# Patient Record
Sex: MALE | Race: WHITE | NOT HISPANIC OR LATINO
[De-identification: names, ages, dates, MRNs, and addresses within clinical notes are randomized per-mention and may not be internally consistent; named-entity substitution may affect disease eponyms.]

---

## 2017-03-21 ENCOUNTER — APPOINTMENT (OUTPATIENT)
Dept: HEART AND VASCULAR | Facility: CLINIC | Age: 67
End: 2017-03-21

## 2017-03-21 VITALS
TEMPERATURE: 98.5 F | WEIGHT: 185 LBS | OXYGEN SATURATION: 96 % | RESPIRATION RATE: 12 BRPM | DIASTOLIC BLOOD PRESSURE: 70 MMHG | BODY MASS INDEX: 30.79 KG/M2 | SYSTOLIC BLOOD PRESSURE: 122 MMHG | HEART RATE: 71 BPM

## 2017-03-21 RX ORDER — B-COMPLEX WITH VITAMIN C
TABLET ORAL
Refills: 0 | Status: ACTIVE | COMMUNITY

## 2017-09-26 ENCOUNTER — APPOINTMENT (OUTPATIENT)
Dept: HEART AND VASCULAR | Facility: CLINIC | Age: 67
End: 2017-09-26
Payer: MEDICARE

## 2017-09-26 VITALS
HEART RATE: 65 BPM | DIASTOLIC BLOOD PRESSURE: 60 MMHG | HEIGHT: 65 IN | OXYGEN SATURATION: 96 % | TEMPERATURE: 98.1 F | WEIGHT: 188.03 LBS | BODY MASS INDEX: 31.33 KG/M2 | SYSTOLIC BLOOD PRESSURE: 108 MMHG | RESPIRATION RATE: 12 BRPM

## 2017-09-26 PROCEDURE — 93000 ELECTROCARDIOGRAM COMPLETE: CPT

## 2017-09-26 PROCEDURE — 99214 OFFICE O/P EST MOD 30 MIN: CPT | Mod: 25

## 2017-09-26 RX ORDER — BETAMETHASONE DIPROPIONATE 0.5 MG/G
0.05 CREAM, AUGMENTED TOPICAL
Qty: 50 | Refills: 0 | Status: ACTIVE | COMMUNITY
Start: 2017-04-12

## 2017-09-26 RX ORDER — BETAMETHASONE DIPROPIONATE 0.5 MG/G
0.05 CREAM TOPICAL
Refills: 0 | Status: DISCONTINUED | COMMUNITY
End: 2017-09-26

## 2017-09-26 RX ORDER — CALCIPOTRIENE 50 UG/G
0.01 CREAM TOPICAL
Qty: 60 | Refills: 0 | Status: ACTIVE | COMMUNITY
Start: 2017-04-12

## 2018-04-26 ENCOUNTER — APPOINTMENT (OUTPATIENT)
Dept: HEART AND VASCULAR | Facility: CLINIC | Age: 68
End: 2018-04-26
Payer: MEDICARE

## 2018-04-26 VITALS
BODY MASS INDEX: 30.66 KG/M2 | DIASTOLIC BLOOD PRESSURE: 70 MMHG | OXYGEN SATURATION: 96 % | TEMPERATURE: 98.8 F | SYSTOLIC BLOOD PRESSURE: 126 MMHG | WEIGHT: 184 LBS | HEIGHT: 65 IN | HEART RATE: 63 BPM | RESPIRATION RATE: 12 BRPM

## 2018-04-26 DIAGNOSIS — E03.9 HYPOTHYROIDISM, UNSPECIFIED: ICD-10-CM

## 2018-04-26 PROCEDURE — 99214 OFFICE O/P EST MOD 30 MIN: CPT | Mod: 25

## 2018-04-26 PROCEDURE — 93000 ELECTROCARDIOGRAM COMPLETE: CPT

## 2018-10-03 ENCOUNTER — APPOINTMENT (OUTPATIENT)
Dept: HEART AND VASCULAR | Facility: CLINIC | Age: 68
End: 2018-10-03
Payer: MEDICARE

## 2018-10-03 PROCEDURE — 99212 OFFICE O/P EST SF 10 MIN: CPT | Mod: 25

## 2018-10-03 PROCEDURE — 78452 HT MUSCLE IMAGE SPECT MULT: CPT

## 2018-10-03 PROCEDURE — 93015 CV STRESS TEST SUPVJ I&R: CPT

## 2018-10-03 PROCEDURE — A9500: CPT

## 2018-10-29 ENCOUNTER — RX RENEWAL (OUTPATIENT)
Age: 68
End: 2018-10-29

## 2018-12-13 ENCOUNTER — EMERGENCY (EMERGENCY)
Facility: HOSPITAL | Age: 68
LOS: 1 days | Discharge: HOME | End: 2018-12-13
Admitting: EMERGENCY MEDICINE
Payer: MEDICARE

## 2018-12-13 VITALS
RESPIRATION RATE: 18 BRPM | WEIGHT: 179.9 LBS | HEART RATE: 66 BPM | TEMPERATURE: 98 F | SYSTOLIC BLOOD PRESSURE: 179 MMHG | OXYGEN SATURATION: 96 % | DIASTOLIC BLOOD PRESSURE: 83 MMHG

## 2018-12-13 DIAGNOSIS — Z79.899 OTHER LONG TERM (CURRENT) DRUG THERAPY: ICD-10-CM

## 2018-12-13 DIAGNOSIS — B02.9 ZOSTER WITHOUT COMPLICATIONS: ICD-10-CM

## 2018-12-13 DIAGNOSIS — R21 RASH AND OTHER NONSPECIFIC SKIN ERUPTION: ICD-10-CM

## 2018-12-13 DIAGNOSIS — I10 ESSENTIAL (PRIMARY) HYPERTENSION: ICD-10-CM

## 2018-12-13 PROCEDURE — 99283 EMERGENCY DEPT VISIT LOW MDM: CPT

## 2018-12-13 RX ORDER — RAMIPRIL 5 MG
0 CAPSULE ORAL
Qty: 0 | Refills: 0 | COMMUNITY

## 2018-12-13 RX ORDER — ACYCLOVIR SODIUM 500 MG
1 VIAL (EA) INTRAVENOUS
Qty: 35 | Refills: 0 | OUTPATIENT
Start: 2018-12-13 | End: 2018-12-19

## 2018-12-13 RX ORDER — ACYCLOVIR SODIUM 500 MG
800 VIAL (EA) INTRAVENOUS ONCE
Qty: 0 | Refills: 0 | Status: DISCONTINUED | OUTPATIENT
Start: 2018-12-13 | End: 2018-12-13

## 2018-12-13 RX ORDER — ACYCLOVIR SODIUM 500 MG
800 VIAL (EA) INTRAVENOUS ONCE
Qty: 0 | Refills: 0 | Status: COMPLETED | OUTPATIENT
Start: 2018-12-13 | End: 2018-12-13

## 2018-12-13 RX ADMIN — Medication 800 MILLIGRAM(S): at 18:03

## 2018-12-13 NOTE — ED ADULT TRIAGE NOTE - CHIEF COMPLAINT QUOTE
noticed rash under right side of chin yesterday -- put topical antibiotic and it seemed better but wanted to have it checked

## 2018-12-13 NOTE — ED PROVIDER NOTE - MEDICAL DECISION MAKING DETAILS
69 yo male in the ER due to new rash on  his neck-vesicular, dermatomal distribution, unilateral- findings suspicious for shingles. will start antivirals, d/c home for out pt f/u with his PMD

## 2018-12-13 NOTE — ED ADULT NURSE NOTE - CHPI ED NUR CONTEXT2
Implemented All Universal Safety Interventions:  [place] to call system. Call bell, personal items and telephone within reach. Instruct patient to call for assistance. Room bathroom lighting operational. Non-slip footwear when patient is off stretcher. Physically safe environment: no spills, clutter or unnecessary equipment. Stretcher in lowest position, wheels locked, appropriate side rails in place. unknown

## 2018-12-13 NOTE — ED ADULT NURSE NOTE - NSIMPLEMENTINTERV_GEN_ALL_ED
Implemented All Universal Safety Interventions:  [place] to call system. Call bell, personal items and telephone within reach. Instruct patient to call for assistance. Room bathroom lighting operational. Non-slip footwear when patient is off stretcher. Physically safe environment: no spills, clutter or unnecessary equipment. Stretcher in lowest position, wheels locked, appropriate side rails in place.

## 2018-12-13 NOTE — ED PROVIDER NOTE - SKIN, MLM
Skin normal color for race, warm, dry and intact. grouped vesicular rash under the right mandible. no active drainage

## 2018-12-13 NOTE — ED ADULT NURSE NOTE - CHPI ED NUR SYMPTOMS NEG
no body aches/no petechia/no vomiting/no pain/no chills/no itching/no scaly patches on skin/no fever/no confusion/no inflammation/no decreased eating/drinking

## 2018-12-13 NOTE — ED PROVIDER NOTE - OBJECTIVE STATEMENT
68bl[age] yesterday  yo mal in the ER due to rash on his right side of the neck. Pt noted 3 small blisters with clear fluid yesterday. this morning it was 4 blisters. pt had staph infection in the past and is very concerned that's its coming back again

## 2018-12-13 NOTE — ED ADULT NURSE NOTE - OBJECTIVE STATEMENT
Pt came to ED complaining of rash since yesterday morning. Pt presents with reddened rash on right chin. Pt purulent drainage or bleeding found at this time. Pt denies fever, chills, D/N/V, chest pain, SOB, /GI symptoms.  Pt is alert and oriented x3. Hx of staph infections. Denies all other medical complaints at this time.

## 2019-01-29 ENCOUNTER — RX RENEWAL (OUTPATIENT)
Age: 69
End: 2019-01-29

## 2019-04-17 ENCOUNTER — APPOINTMENT (OUTPATIENT)
Dept: HEART AND VASCULAR | Facility: CLINIC | Age: 69
End: 2019-04-17
Payer: MEDICARE

## 2019-04-17 VITALS
SYSTOLIC BLOOD PRESSURE: 116 MMHG | WEIGHT: 185 LBS | OXYGEN SATURATION: 95 % | HEIGHT: 64.5 IN | HEART RATE: 66 BPM | BODY MASS INDEX: 31.2 KG/M2 | RESPIRATION RATE: 14 BRPM | TEMPERATURE: 98.1 F | DIASTOLIC BLOOD PRESSURE: 72 MMHG

## 2019-04-17 PROBLEM — L57.0 ACTINIC KERATOSIS: Chronic | Status: ACTIVE | Noted: 2018-12-13

## 2019-04-17 PROBLEM — I10 ESSENTIAL (PRIMARY) HYPERTENSION: Chronic | Status: ACTIVE | Noted: 2018-12-13

## 2019-04-17 PROBLEM — A49.01 METHICILLIN SUSCEPTIBLE STAPHYLOCOCCUS AUREUS INFECTION, UNSPECIFIED SITE: Chronic | Status: ACTIVE | Noted: 2018-12-13

## 2019-04-17 PROCEDURE — 93000 ELECTROCARDIOGRAM COMPLETE: CPT

## 2019-04-17 PROCEDURE — 99214 OFFICE O/P EST MOD 30 MIN: CPT

## 2019-04-17 NOTE — DISCUSSION/SUMMARY
[FreeTextEntry1] : CAD--I suggested that he continue his current medication. I encouraged healthy diet and aerobic activity

## 2019-07-11 ENCOUNTER — APPOINTMENT (OUTPATIENT)
Dept: ORTHOPEDIC SURGERY | Facility: CLINIC | Age: 69
End: 2019-07-11
Payer: MEDICARE

## 2019-07-11 VITALS — HEIGHT: 65 IN | BODY MASS INDEX: 30.82 KG/M2 | WEIGHT: 185 LBS | RESPIRATION RATE: 16 BRPM

## 2019-07-11 PROCEDURE — 73070 X-RAY EXAM OF ELBOW: CPT | Mod: 50

## 2019-07-11 PROCEDURE — 99203 OFFICE O/P NEW LOW 30 MIN: CPT | Mod: 25

## 2019-07-11 PROCEDURE — 20605 DRAIN/INJ JOINT/BURSA W/O US: CPT | Mod: RT

## 2019-07-11 NOTE — CONSULT LETTER
[Dear  ___] : Dear  [unfilled], [FreeTextEntry3] : Carlos Rosas M.D., FAAOS\par Co-Director\par [facility]\par

## 2019-07-11 NOTE — PHYSICAL EXAM
[de-identified] : Physical exam shows the patient to be alert and oriented x3, capable of ambulation. The patient is well-developed and well-nourished in no apparent respiratory distress. Majority of the skin is intact bilaterally in the upper extremities without lymphadenopathy at the elbows.\par \par There is a 4 x 4 centimeters soft tissue mass in the dorsal aspect of the right olecranon which is nontender and has no evidence of infection.\par \par The biceps and triceps is intact with full range of motion of the elbow with no tenderness over the insertion site of the biceps or triceps.\par \par There is no tenderness of the mediolateral epicondyles and full range of motion the elbow which is symmetric to the opposite side.\par \par No sensitivity over the radial ulnar and median nerves no evidence of instability of the nerve.\par \par The wrists have a symmetric range of motion bilaterally. There is no tenderness over the scaphoid, scapholunate or lunotriquetral ligaments bilaterally. There is a negative Velasquez test bilaterally. There is negative tenderness over the radial ulnar joint or TFCC and no evidence of instability bilaterally. No tenderness over the pisotriquetral or hamate hook or CMC joint bilaterally. There is 5 over 5 strength of the wrists bilaterally.\par \par There is good capillary refill of the digits bilaterally.There is no masses or sensitivity over the median and ulnar nerves at the level of the wrist. There is a negative Tinel's and negative Phalen's sign bilaterally. The sensation is grossly intact bilaterally. [de-identified] : PA and lateral of the elbows bilaterally show soft tissue density of the right elbow in the region of the olecranon bursa without evidence of soft tissue calcifications. Joint spaces are well preserved. There is symmetric with the opposite left elbow.

## 2019-07-11 NOTE — ASSESSMENT
[FreeTextEntry1] : 6 months status post right olecranon bursitis without evidence of infection.\par \par The risks, benefits, alternatives and associated differential diagnosis was discussed with the patient. Options ranged from conservative care to surgical intervention were reviewed and all questions answered. Patient appeared to have an excellent understanding of the risks as well as differential diagnosis associated with this condition.\par \par Patient elected aspiration which yielded 8 cc of clear yellow fluid without evidence of infection.\par The area was prepped with alcohol prior to aspiration to reduce risk of infection.\par 1 cc of Kenalog was instilled into the bursitis in hopes of reducing the risk of recurrence.\par \par A bulky Cai dressing was applied and a home program was outlined.\par \par Return to the office in 4 weeks if not fully resolved otherwise on an as-needed basis.

## 2019-07-11 NOTE — HISTORY OF PRESENT ILLNESS
[Left] : left hand dominant [FreeTextEntry1] : DOI- 2/2019\par Patient presents with right elbow injury sustained from a fall. He never had it treated and states it is not painful. There is swelling and bruising on the elbow

## 2019-07-23 ENCOUNTER — APPOINTMENT (OUTPATIENT)
Dept: CARDIOLOGY | Facility: CLINIC | Age: 69
End: 2019-07-23

## 2019-08-08 ENCOUNTER — APPOINTMENT (OUTPATIENT)
Dept: ORTHOPEDIC SURGERY | Facility: CLINIC | Age: 69
End: 2019-08-08
Payer: MEDICARE

## 2019-08-08 VITALS — RESPIRATION RATE: 16 BRPM | HEIGHT: 65 IN | BODY MASS INDEX: 30.82 KG/M2 | WEIGHT: 185 LBS

## 2019-08-08 PROCEDURE — 20605 DRAIN/INJ JOINT/BURSA W/O US: CPT | Mod: RT

## 2019-08-08 PROCEDURE — 99214 OFFICE O/P EST MOD 30 MIN: CPT | Mod: 25

## 2019-10-31 ENCOUNTER — APPOINTMENT (OUTPATIENT)
Dept: HEART AND VASCULAR | Facility: CLINIC | Age: 69
End: 2019-10-31
Payer: MEDICARE

## 2019-10-31 VITALS
WEIGHT: 181.04 LBS | BODY MASS INDEX: 30.16 KG/M2 | TEMPERATURE: 98.3 F | RESPIRATION RATE: 16 BRPM | OXYGEN SATURATION: 95 % | HEART RATE: 72 BPM | SYSTOLIC BLOOD PRESSURE: 126 MMHG | HEIGHT: 65 IN | DIASTOLIC BLOOD PRESSURE: 70 MMHG

## 2019-10-31 PROCEDURE — 93000 ELECTROCARDIOGRAM COMPLETE: CPT

## 2019-10-31 PROCEDURE — 99214 OFFICE O/P EST MOD 30 MIN: CPT

## 2019-10-31 NOTE — DISCUSSION/SUMMARY
[FreeTextEntry1] : CAD--I suggested that he continue his cardiac medication and risk factor reduction

## 2019-10-31 NOTE — HISTORY OF PRESENT ILLNESS
[FreeTextEntry1] : 69 year male who is walking 1 mile a few times a week without any symptoms. He climbs 2 half flights of stairs 2x/week without SOB or chest pain. He denies having PND, orthopnea, swelling. No palpitations or dizziness

## 2020-02-20 ENCOUNTER — APPOINTMENT (OUTPATIENT)
Dept: OTOLARYNGOLOGY | Facility: CLINIC | Age: 70
End: 2020-02-20
Payer: MEDICARE

## 2020-02-20 VITALS
BODY MASS INDEX: 29.82 KG/M2 | HEIGHT: 65 IN | WEIGHT: 179 LBS | HEART RATE: 61 BPM | DIASTOLIC BLOOD PRESSURE: 74 MMHG | SYSTOLIC BLOOD PRESSURE: 148 MMHG

## 2020-02-20 PROCEDURE — 99204 OFFICE O/P NEW MOD 45 MIN: CPT

## 2020-02-28 NOTE — HISTORY OF PRESENT ILLNESS
[de-identified] : 70M here for initial evaluation.\par \par Less than 2 weeks ago he noticed a bump on his tongue with surrounding yellow/brownish dots. He started peroxide rinses on his own and the discoloration improved. Since then, the bump has also gotten a bit smaller and he is here for evaluation. There is no pain and no bleeding, no tongue weakness or altered sensation.\par No recent trauma.\par Nonsmoker, no etoh. \par Not on any steroid inhalers.\par \par ROS otherwise unremarkable.

## 2020-02-28 NOTE — PHYSICAL EXAM
[de-identified] : no palpable masses/lesions, neck soft and flat [Midline] : trachea located in midline position [de-identified] : tongue midline, fully mobile. ~5x5mm firm, well defined submucosal lesion over dorsal oral tongue, just to the right of midline, nontender, overlying mucosa intact, no ulceration or friability. [Normal] : orientation to person, place, and time: normal

## 2020-02-28 NOTE — CONSULT LETTER
[Dear  ___] : Dear  [unfilled], [Consult Closing:] : Thank you very much for allowing me to participate in the care of this patient.  If you have any questions, please do not hesitate to contact me. [Sincerely,] : Sincerely, [Courtesy Letter:] : I had the pleasure of seeing your patient, [unfilled], in my office today. [FreeTextEntry3] : Abhijit Yoon MD\par Department of Otolaryngology - Head and Neck Surgery\par [facility] [DrMelyssa  ___] : Dr. KHAN [DrMelyssa ___] : Dr. KHAN

## 2020-02-28 NOTE — ASSESSMENT
[FreeTextEntry1] : 70M here for initial evaluation. 13 days ago he noticed a bump on his tongue with surrounding yellow/brownish dots. He started peroxide rinses on his own and the discoloration improved. Since then, the bump has also gotten a bit smaller and he is here for evaluation. There is no pain and no bleeding, no tongue weakness or altered sensation. There is no recent trauma. He is a nonsmoker, no h/o etoh use. He is also not on any steroid inhalers. On exam, the tongue is fully mobile. There is a 5x5mm firm, well defined submucosal lesion over the dorsal oral tongue, just to the right of midline; it is nontender and the overlying mucosa is intact, with no ulceration or friability. The rest of the head and neck exam is unremarkable.\par He has a firm, well defined submucosal lesion of the right dorsal oral tongue of unclear etiology. It clinically appears benign and it has been present for less than 2 weeks and he feels it is getting smaller. For now, since only present for 13 days, will observe closely - RTO in 2-3 weeks. Should it remain (unchanged or larger) then would proceed to biopsy and getting imaging. This was all discussed at length and all questions answered.

## 2020-03-17 ENCOUNTER — APPOINTMENT (OUTPATIENT)
Dept: OTOLARYNGOLOGY | Facility: CLINIC | Age: 70
End: 2020-03-17
Payer: MEDICARE

## 2020-03-17 VITALS
BODY MASS INDEX: 29.89 KG/M2 | WEIGHT: 179.4 LBS | HEIGHT: 65 IN | HEART RATE: 65 BPM | TEMPERATURE: 98.7 F | SYSTOLIC BLOOD PRESSURE: 142 MMHG | DIASTOLIC BLOOD PRESSURE: 78 MMHG

## 2020-03-17 PROCEDURE — 99213 OFFICE O/P EST LOW 20 MIN: CPT

## 2020-03-17 NOTE — PHYSICAL EXAM
[de-identified] : no palpable masses/lesions, neck soft and flat [Midline] : trachea located in midline position [de-identified] : tongue midline, fully mobile. ~5x5mm firm, well defined submucosal lesion over dorsal oral tongue, just to the right of midline, nontender, overlying mucosa intact, no ulceration or friability. geographic tongue, some darker discoloration over dorsal surface [Normal] : no rashes

## 2020-03-17 NOTE — PHYSICAL EXAM
[de-identified] : no palpable masses/lesions, neck soft and flat [Midline] : trachea located in midline position [de-identified] : tongue midline, fully mobile. ~5x5mm firm, well defined submucosal lesion over dorsal oral tongue, just to the right of midline, nontender, overlying mucosa intact, no ulceration or friability. geographic tongue, some darker discoloration over dorsal surface [Normal] : no rashes

## 2020-03-17 NOTE — ASSESSMENT
[FreeTextEntry1] : 70M here in followup. Since last seen four weeks prior, he has no new complaints. The lesion on his tongue has remained, unchanged - no bigger or smaller. He was initially seen four weeks ago for a bump on his tongue for two weeks with surrounding yellow/brownish dots. At that time, he started peroxide rinses on his own and the discoloration improved. Since then, the bump has also gotten a bit smaller. \par There remains no pain and no bleeding, no tongue weakness or altered sensation and otherwise no other associated sx. There is no recent trauma. He is a nonsmoker, no h/o etoh use. \par On exam, the tongue is fully mobile. There is a 5x5mm firm, well defined submucosal lesion over the dorsal oral tongue, just to the right of midline; it is nontender and the overlying mucosa is intact, with no ulceration or friability. There is a symmetrically geographic tongue, some darker discoloration over the dorsal surface. The rest of the head and neck exam is unremarkable.\par He still has a firm, well defined, painless submucosal lesion of the right dorsal oral tongue of unclear etiology. It clinically appears benign and it has been present for around 6 weeks and he feels it is not changing in size. At this time, I will proceed with MRI for imaging. MRI results to dictate need for tissue sampling. I ill touch base w pt after imaging. This was all discussed at length and all questions answered.

## 2020-03-17 NOTE — CONSULT LETTER
[Dear  ___] : Dear  [unfilled], [Courtesy Letter:] : I had the pleasure of seeing your patient, [unfilled], in my office today. [Consult Closing:] : Thank you very much for allowing me to participate in the care of this patient.  If you have any questions, please do not hesitate to contact me. [Sincerely,] : Sincerely, [FreeTextEntry3] : Abhijit Yoon MD\par Department of Otolaryngology - Head and Neck Surgery\par [facility] [DrMelyssa  ___] : Dr. KHAN [DrMelyssa ___] : Dr. KHAN

## 2020-03-17 NOTE — HISTORY OF PRESENT ILLNESS
[de-identified] : 70M here in followup.\par \par Since last seen four weeks prior, he has no new complaints. The lesion on his tongue has remained, unchanged - no bigger or smaller.\par Initially seen four weeks ago for a bump on his tongue for two weeks with surrounding yellow/brownish dots. At that time, he started peroxide rinses on his own and the discoloration improved. Since then, the bump has also gotten a bit smaller. \par \par There remains no pain and no bleeding, no tongue weakness or altered sensation and otherwise no other associated sx.\par \par No recent trauma.\par Nonsmoker, no etoh. \par Not on any steroid inhalers.\par \par ROS otherwise unremarkable.

## 2020-03-17 NOTE — CONSULT LETTER
[Dear  ___] : Dear  [unfilled], [Courtesy Letter:] : I had the pleasure of seeing your patient, [unfilled], in my office today. [Consult Closing:] : Thank you very much for allowing me to participate in the care of this patient.  If you have any questions, please do not hesitate to contact me. [Sincerely,] : Sincerely, [FreeTextEntry3] : Abhijit Yoon MD\par Department of Otolaryngology - Head and Neck Surgery\par [facility] [DrMelyssa  ___] : Dr. KHAN [DrMelyssa ___] : Dr. KHAN

## 2020-07-10 ENCOUNTER — APPOINTMENT (OUTPATIENT)
Dept: HEART AND VASCULAR | Facility: CLINIC | Age: 70
End: 2020-07-10
Payer: MEDICARE

## 2020-07-10 VITALS
RESPIRATION RATE: 16 BRPM | BODY MASS INDEX: 29.99 KG/M2 | SYSTOLIC BLOOD PRESSURE: 122 MMHG | HEIGHT: 65 IN | HEART RATE: 62 BPM | DIASTOLIC BLOOD PRESSURE: 84 MMHG | OXYGEN SATURATION: 96 % | TEMPERATURE: 98.9 F | WEIGHT: 180 LBS

## 2020-07-10 PROCEDURE — 93000 ELECTROCARDIOGRAM COMPLETE: CPT

## 2020-07-10 PROCEDURE — 99214 OFFICE O/P EST MOD 30 MIN: CPT

## 2020-07-10 NOTE — HISTORY OF PRESENT ILLNESS
[FreeTextEntry1] : 70 year male who denies having any chest pain, SOB, palpitations, edema, PND or othopnea. He believes he had an Echocardiogram with Dr Cameron. He describes a scare relating to a tongue lesion. Recent labs reviewed

## 2020-07-10 NOTE — DISCUSSION/SUMMARY
[FreeTextEntry1] : CAD--I asked him to continue his cardiac medications and return in October for a Nuclear Stress Test

## 2020-07-10 NOTE — PHYSICAL EXAM
[Normal Appearance] : normal appearance [General Appearance - Well Developed] : well developed [Well Groomed] : well groomed [No Deformities] : no deformities [General Appearance - Well Nourished] : well nourished [Normal Conjunctiva] : the conjunctiva exhibited no abnormalities [General Appearance - In No Acute Distress] : no acute distress [] : no respiratory distress [Respiration, Rhythm And Depth] : normal respiratory rhythm and effort [Exaggerated Use Of Accessory Muscles For Inspiration] : no accessory muscle use [Auscultation Breath Sounds / Voice Sounds] : lungs were clear to auscultation bilaterally [Heart Rate And Rhythm] : heart rate and rhythm were normal [Heart Sounds] : normal S1 and S2 [Oriented To Time, Place, And Person] : oriented to person, place, and time [Skin Turgor] : normal skin turgor [Abnormal Walk] : normal gait [No Anxiety] : not feeling anxious [Mood] : the mood was normal [Affect] : the affect was normal

## 2020-10-21 ENCOUNTER — APPOINTMENT (OUTPATIENT)
Dept: HEART AND VASCULAR | Facility: CLINIC | Age: 70
End: 2020-10-21
Payer: MEDICARE

## 2020-10-21 VITALS
HEIGHT: 65 IN | DIASTOLIC BLOOD PRESSURE: 80 MMHG | RESPIRATION RATE: 16 BRPM | SYSTOLIC BLOOD PRESSURE: 132 MMHG | BODY MASS INDEX: 29.49 KG/M2 | TEMPERATURE: 93.3 F | OXYGEN SATURATION: 97 % | HEART RATE: 59 BPM | WEIGHT: 177 LBS

## 2020-10-21 PROCEDURE — 78452 HT MUSCLE IMAGE SPECT MULT: CPT

## 2020-10-21 PROCEDURE — 93015 CV STRESS TEST SUPVJ I&R: CPT

## 2020-10-21 PROCEDURE — A9500: CPT

## 2020-10-21 NOTE — ASSESSMENT
[FreeTextEntry1] : At the time of the patient's visit a Nuclear Stress Test was performed to evaluate for exercise induced arrhythmia and ischemia. At the time of the visit the results were reviewed with patient \par \par CAD--I encouraged continued risk factor reduction

## 2021-04-20 ENCOUNTER — NON-APPOINTMENT (OUTPATIENT)
Age: 71
End: 2021-04-20

## 2021-04-21 ENCOUNTER — APPOINTMENT (OUTPATIENT)
Dept: HEART AND VASCULAR | Facility: CLINIC | Age: 71
End: 2021-04-21
Payer: MEDICARE

## 2021-04-21 VITALS
OXYGEN SATURATION: 96 % | SYSTOLIC BLOOD PRESSURE: 124 MMHG | BODY MASS INDEX: 29.49 KG/M2 | HEIGHT: 65 IN | HEART RATE: 67 BPM | WEIGHT: 177.01 LBS | RESPIRATION RATE: 16 BRPM | DIASTOLIC BLOOD PRESSURE: 70 MMHG | TEMPERATURE: 93.7 F

## 2021-04-21 PROCEDURE — 93000 ELECTROCARDIOGRAM COMPLETE: CPT

## 2021-04-21 PROCEDURE — 99214 OFFICE O/P EST MOD 30 MIN: CPT

## 2021-04-21 NOTE — HISTORY OF PRESENT ILLNESS
[FreeTextEntry1] : 71 year male who is now on Vitamin C 1000, Vitamin D3 5000 iu daily, Vitamin B12 mcg, CoQ 10 100 mg and Zinc 50 mg. He is also on 2 tablespoons of psyllium husk. He denies having any chest pain, SOB, [place], dizziness, palpitations, orthopnea, PND or syncope

## 2021-04-21 NOTE — PHYSICAL EXAM
[General Appearance - Well Developed] : well developed [Normal Appearance] : normal appearance [Well Groomed] : well groomed [General Appearance - Well Nourished] : well nourished [No Deformities] : no deformities [General Appearance - In No Acute Distress] : no acute distress [Normal Conjunctiva] : the conjunctiva exhibited no abnormalities [] : no respiratory distress [Respiration, Rhythm And Depth] : normal respiratory rhythm and effort [Exaggerated Use Of Accessory Muscles For Inspiration] : no accessory muscle use [Auscultation Breath Sounds / Voice Sounds] : lungs were clear to auscultation bilaterally [Heart Sounds] : normal S1 and S2 [Abnormal Walk] : normal gait [Skin Turgor] : normal skin turgor [Affect] : the affect was normal [Oriented To Time, Place, And Person] : oriented to person, place, and time [Mood] : the mood was normal [No Anxiety] : not feeling anxious

## 2021-04-21 NOTE — ASSESSMENT
[FreeTextEntry1] :  CAD--We discussed that a statin like Lipitor 40 mg or Crestor 20 mg daily is indicated based on current GDMT (guideline directed medical therapy) in placed of his Simvastatin despite his low Lipids numbers

## 2021-07-08 ENCOUNTER — APPOINTMENT (OUTPATIENT)
Dept: UROLOGY | Facility: CLINIC | Age: 71
End: 2021-07-08
Payer: MEDICARE

## 2021-07-08 VITALS
SYSTOLIC BLOOD PRESSURE: 148 MMHG | HEART RATE: 66 BPM | HEIGHT: 66 IN | OXYGEN SATURATION: 96 % | BODY MASS INDEX: 28.93 KG/M2 | DIASTOLIC BLOOD PRESSURE: 77 MMHG | WEIGHT: 180 LBS | TEMPERATURE: 97.2 F

## 2021-07-08 DIAGNOSIS — I10 ESSENTIAL (PRIMARY) HYPERTENSION: ICD-10-CM

## 2021-07-08 DIAGNOSIS — M70.21 OLECRANON BURSITIS, RIGHT ELBOW: ICD-10-CM

## 2021-07-08 DIAGNOSIS — K14.8 OTHER DISEASES OF TONGUE: ICD-10-CM

## 2021-07-08 DIAGNOSIS — L40.9 PSORIASIS, UNSPECIFIED: ICD-10-CM

## 2021-07-08 LAB
BILIRUB UR QL STRIP: NORMAL
CLARITY UR: CLEAR
COLLECTION METHOD: NORMAL
GLUCOSE UR-MCNC: NORMAL
HCG UR QL: 0.2 EU/DL
HGB UR QL STRIP.AUTO: NORMAL
KETONES UR-MCNC: NORMAL
LEUKOCYTE ESTERASE UR QL STRIP: NORMAL
NITRITE UR QL STRIP: NORMAL
PH UR STRIP: 7
PROT UR STRIP-MCNC: 100
SP GR UR STRIP: 1.03

## 2021-07-08 PROCEDURE — 99204 OFFICE O/P NEW MOD 45 MIN: CPT

## 2021-07-08 PROCEDURE — 51798 US URINE CAPACITY MEASURE: CPT

## 2021-07-08 RX ORDER — ROSUVASTATIN CALCIUM 20 MG/1
20 TABLET, FILM COATED ORAL
Refills: 0 | Status: ACTIVE | COMMUNITY

## 2021-07-08 NOTE — PHYSICAL EXAM
[General Appearance - Well Developed] : well developed [General Appearance - Well Nourished] : well nourished [Normal Appearance] : normal appearance [Well Groomed] : well groomed [General Appearance - In No Acute Distress] : no acute distress [Edema] : no peripheral edema [Respiration, Rhythm And Depth] : normal respiratory rhythm and effort [Exaggerated Use Of Accessory Muscles For Inspiration] : no accessory muscle use [Abdomen Soft] : soft [Abdomen Tenderness] : non-tender [Costovertebral Angle Tenderness] : no ~M costovertebral angle tenderness [Urethral Meatus] : meatus normal [Urinary Bladder Findings] : the bladder was normal on palpation [Scrotum] : the scrotum was normal [Testes Mass (___cm)] : there were no testicular masses [No Prostate Nodules] : no prostate nodules [Normal Station and Gait] : the gait and station were normal for the patient's age [] : no rash [No Focal Deficits] : no focal deficits [Oriented To Time, Place, And Person] : oriented to person, place, and time [Affect] : the affect was normal [Mood] : the mood was normal [Not Anxious] : not anxious [No Palpable Adenopathy] : no palpable adenopathy [Heart Rate And Rhythm] : Heart rate and rhythm were normal [Abdomen Mass (___ Cm)] : no abdominal mass palpated [Penis Abnormality] : normal circumcised penis [Epididymis] : the epididymides were normal [Testes Tenderness] : no tenderness of the testes [Skin Color & Pigmentation] : normal skin color and pigmentation [FreeTextEntry1] : Mild focal glans erythema.

## 2021-07-08 NOTE — HISTORY OF PRESENT ILLNESS
[FreeTextEntry1] : Mr. LIDA SEAY comes in today for a urologic evaluation.  He presents with moderate stable lower urinary tract symptoms (obstructive and irritative) with nocturia x 2.\par IPSS: 20/35\par Sono: 57cc PVR\par \par His erections are reportedly normal.\par \par Pelvic US:  3/30/21--76cc prostate. 115cc PVR. Bladder diverticulum\par \par Creat:  4/16/21--0.75mg/dl\par \par PSA:  4/16/21--0.33ng/ml\par

## 2021-07-08 NOTE — ASSESSMENT
[FreeTextEntry1] : I discussed the findings and options with Mr. LIDA HOFFMAN in detail.  No intervention is warranted for his stable lower urinary tract symptoms as he does not want to be on pharmacologic therapy or undergo surgical intervention.  Consequently, Mr. Hoffman will simply monitor his progress and proceed with a digital rectal exam and PSA annually.\par \par \par

## 2021-07-08 NOTE — LETTER BODY
[Dear  ___] : Dear  [unfilled], [Consult Letter:] : I had the pleasure of evaluating your patient, [unfilled]. [Please see my note below.] : Please see my note below. [Consult Closing:] : Thank you very much for allowing me to participate in the care of this patient.  If you have any questions, please do not hesitate to contact me. [Sincerely,] : Sincerely, [FreeTextEntry3] : Liu Arriaga MD, FACS

## 2021-07-08 NOTE — ADDENDUM
[FreeTextEntry1] : A portion of this note was written by [Felix Rosario] on 07/07/2021 acting as a scribe for Dr. Arriaga. \par \par I have personally reviewed the chart and agree that the record accurately reflects my personal performance of the history, physical exam, assessment, and plan.

## 2021-10-20 ENCOUNTER — APPOINTMENT (OUTPATIENT)
Dept: HEART AND VASCULAR | Facility: CLINIC | Age: 71
End: 2021-10-20
Payer: MEDICARE

## 2021-10-20 VITALS
WEIGHT: 172 LBS | DIASTOLIC BLOOD PRESSURE: 86 MMHG | OXYGEN SATURATION: 95 % | SYSTOLIC BLOOD PRESSURE: 140 MMHG | BODY MASS INDEX: 27.64 KG/M2 | HEIGHT: 66 IN | TEMPERATURE: 97.1 F | HEART RATE: 60 BPM

## 2021-10-20 VITALS — SYSTOLIC BLOOD PRESSURE: 136 MMHG | DIASTOLIC BLOOD PRESSURE: 78 MMHG

## 2021-10-20 PROCEDURE — 99214 OFFICE O/P EST MOD 30 MIN: CPT

## 2021-10-20 NOTE — ASSESSMENT
[FreeTextEntry1] : We discussed the goal of BP < 130/80 with option to increase Ramipril to 5 mg daily if not improved

## 2021-10-20 NOTE — HISTORY OF PRESENT ILLNESS
[FreeTextEntry1] : 71 year male with CAD. We reviewed his recent labs. He denies having chest pain, SOB, [place], dizziness, palpitations, orthopnea, PND or syncope

## 2021-11-19 ENCOUNTER — APPOINTMENT (OUTPATIENT)
Dept: UROLOGY | Facility: CLINIC | Age: 71
End: 2021-11-19
Payer: MEDICARE

## 2021-11-19 VITALS
OXYGEN SATURATION: 94 % | TEMPERATURE: 97.6 F | HEART RATE: 71 BPM | DIASTOLIC BLOOD PRESSURE: 75 MMHG | SYSTOLIC BLOOD PRESSURE: 149 MMHG

## 2021-11-19 PROCEDURE — 99215 OFFICE O/P EST HI 40 MIN: CPT

## 2021-11-19 PROCEDURE — 51798 US URINE CAPACITY MEASURE: CPT

## 2021-11-19 NOTE — HISTORY OF PRESENT ILLNESS
[FreeTextEntry1] : Mr. LIDA HOFFMAN comes in today for somewhat urgent urologic follow-up after experiencing gross painless hematuria for 2 days earlier this week. He denies a history of urolithiasis or trauma. \par \par From his general urologic history, Mr. Hoffman reports moderate stable lower urinary tract symptoms (obstructive and irritative) with nocturia x 2.\par IPSS: 12/35\par Sono: 91cc PVR; 45cc prostate\par \par His erections are reportedly normal.\par \par Pelvic US:  3/30/21--76cc prostate. 115cc PVR. Bladder diverticulum\par \par Creat:  11/8/21--0.62; 4/16/21--0.75mg/dl\par \par PSA:  11/8/21--1.23; 4/13/21--1.34; 2/4/21--1.4; 6/3/20--1.64; 7/15/19--1.37; 4/12/18--1.39; 1/24/17--0.96; 4/13/16--1.69; 5/19/15--1.35\par

## 2021-11-19 NOTE — ADDENDUM
[FreeTextEntry1] : A portion of this note was written by [Felix Rosario] on 11/18/2021 acting as a scribe for Dr. Arriaga. \par \par I have personally reviewed the chart and agree that the record accurately reflects my personal performance of the history, physical exam, assessment, and plan.

## 2021-11-19 NOTE — PHYSICAL EXAM
[General Appearance - Well Developed] : well developed [General Appearance - Well Nourished] : well nourished [Normal Appearance] : normal appearance [Well Groomed] : well groomed [General Appearance - In No Acute Distress] : no acute distress [Abdomen Soft] : soft [Abdomen Tenderness] : non-tender [Abdomen Mass (___ Cm)] : no abdominal mass palpated [Costovertebral Angle Tenderness] : no ~M costovertebral angle tenderness [Urethral Meatus] : meatus normal [Penis Abnormality] : normal circumcised penis [Urinary Bladder Findings] : the bladder was normal on palpation [Scrotum] : the scrotum was normal [Epididymis] : the epididymides were normal [Testes Tenderness] : no tenderness of the testes [Testes Mass (___cm)] : there were no testicular masses [No Prostate Nodules] : no prostate nodules [Skin Color & Pigmentation] : normal skin color and pigmentation [Heart Rate And Rhythm] : Heart rate and rhythm were normal [Edema] : no peripheral edema [] : no respiratory distress [Respiration, Rhythm And Depth] : normal respiratory rhythm and effort [Exaggerated Use Of Accessory Muscles For Inspiration] : no accessory muscle use [Oriented To Time, Place, And Person] : oriented to person, place, and time [Affect] : the affect was normal [Mood] : the mood was normal [Not Anxious] : not anxious [Normal Station and Gait] : the gait and station were normal for the patient's age [No Focal Deficits] : no focal deficits [No Palpable Adenopathy] : no palpable adenopathy [Prostate Tenderness] : the prostate was not tender [FreeTextEntry1] : Mild focal glans erythema.

## 2021-11-19 NOTE — LETTER BODY
[Dear  ___] : Dear  [unfilled], [Consult Letter:] : I had the pleasure of evaluating your patient, [unfilled]. [Please see my note below.] : Please see my note below. [Consult Closing:] : Thank you very much for allowing me to participate in the care of this patient.  If you have any questions, please do not hesitate to contact me. [Sincerely,] : Sincerely, [DrMelyssa  ___] : Dr. KHAN [FreeTextEntry3] : Liu Arriaga MD, FACS

## 2021-11-19 NOTE — ASSESSMENT
[FreeTextEntry1] : I discussed the findings and options with Mr. LIDA SEAY in detail.  He does not want any intervention for the stable lower urinary tract symptoms/urinary retention.\par \par Regarding the gross painless hematuria, I advised that he proceed with a CT of the abdomen and pelvis and return for an in office cystoscopy.  A urine culture and cytology are pending.\par \par \par

## 2021-11-22 LAB
APPEARANCE: CLEAR
BACTERIA UR CULT: NORMAL
BACTERIA: NEGATIVE
BILIRUB UR QL STRIP: NORMAL
BILIRUBIN URINE: NEGATIVE
BLOOD URINE: NEGATIVE
CLARITY UR: CLEAR
COLLECTION METHOD: NORMAL
COLOR: YELLOW
GLUCOSE QUALITATIVE U: NEGATIVE
GLUCOSE UR-MCNC: NORMAL
HCG UR QL: 0.2 EU/DL
HGB UR QL STRIP.AUTO: NORMAL
HYALINE CASTS: 0 /LPF
KETONES UR-MCNC: NORMAL
KETONES URINE: NEGATIVE
LEUKOCYTE ESTERASE UR QL STRIP: NORMAL
LEUKOCYTE ESTERASE URINE: NEGATIVE
MICROSCOPIC-UA: NORMAL
NITRITE UR QL STRIP: NORMAL
NITRITE URINE: NEGATIVE
PH UR STRIP: 5
PH URINE: 5.5
PROT UR STRIP-MCNC: 30
PROTEIN URINE: ABNORMAL
RED BLOOD CELLS URINE: 3 /HPF
SP GR UR STRIP: 1.03
SPECIFIC GRAVITY URINE: 1.02
SQUAMOUS EPITHELIAL CELLS: 0 /HPF
UROBILINOGEN URINE: NORMAL
WHITE BLOOD CELLS URINE: 5 /HPF

## 2021-11-24 LAB — URINE CYTOLOGY: NORMAL

## 2022-01-13 ENCOUNTER — APPOINTMENT (OUTPATIENT)
Dept: UROLOGY | Facility: CLINIC | Age: 72
End: 2022-01-13
Payer: MEDICARE

## 2022-01-13 VITALS — HEART RATE: 64 BPM | SYSTOLIC BLOOD PRESSURE: 156 MMHG | TEMPERATURE: 96.8 F | DIASTOLIC BLOOD PRESSURE: 80 MMHG

## 2022-01-13 LAB
BILIRUB UR QL STRIP: NORMAL
CLARITY UR: CLEAR
COLLECTION METHOD: NORMAL
GLUCOSE UR-MCNC: NORMAL
HCG UR QL: 0.2 EU/DL
HGB UR QL STRIP.AUTO: NORMAL
KETONES UR-MCNC: NORMAL
LEUKOCYTE ESTERASE UR QL STRIP: NORMAL
NITRITE UR QL STRIP: NORMAL
PH UR STRIP: 6.5
PROT UR STRIP-MCNC: NORMAL
SP GR UR STRIP: 1.02

## 2022-01-13 PROCEDURE — 81003 URINALYSIS AUTO W/O SCOPE: CPT | Mod: QW

## 2022-01-13 PROCEDURE — 52281 CYSTOSCOPY AND TREATMENT: CPT

## 2022-01-13 NOTE — HISTORY OF PRESENT ILLNESS
[FreeTextEntry1] : Mr. LIDA HOFFMAN comes in today for his urologic follow-up, including a scheduled cystoscopy to evaluate two days of gross painless hematuria. He denies a history of urolithiasis or trauma. A CT scan identified a 1.3 solid left upper pole enhancing renal lesion (see below). \par \par From his general urologic history, Mr. Hoffman reports moderate stable lower urinary tract symptoms (obstructive and irritative) with nocturia x 2.\par \par His erections are reportedly normal.\par \par Pelvic US:  3/30/21--76cc prostate. 115cc PVR. Bladder diverticulum\par \par Creat:  11/8/21--0.62; 4/16/21--0.75mg/dl\par \par PSA:  11/8/21--1.23; 4/13/21--1.34; 2/4/21--1.4; 6/3/20--1.64; 7/15/19--1.37; 4/12/18--1.39; 1/24/17--0.96; 4/13/16--1.69; 5/19/15--1.35\par \par CT: 12/23/21--1.1 x 0.6 x 1.3cm cystic and solid left upper pole enhancing renal lesion without internal fat or calcification may represent a small renal cell carcinoma or oncocytoma; \par

## 2022-01-13 NOTE — LETTER BODY
DATE OF CONSULTATION:  2018

 

REASON FOR CONSULTATION:  Left spontaneous pneumothorax.

 

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

been smoking for more than 60 years and smoked a pack of cigarettes

yesterday.  She became acutely short of breath this morning and called EMS

and presented to the Emergency Department.  She was found to have left-sided

pneumothorax.  She felt much better.  I was asked to evaluate her.  She is

being admitted to the hospitalist service.  She has multiple other issues. 

The pneumothorax was evaluated.  She had an x-ray done at 1:30 and then we

repeated one at almost 4:00 and there really has not been much of a change,

although she had a larger basilar component.  She is not short of breath. 

She is on oxygen with 95% saturations.  She has never had spontaneous

pneumothorax in the past.

 

PAST MEDICAL HISTORY:

1.  Active cigarette smoking (began smoking at age 19).

2.  Chronic obstructive pulmonary disease with emphysema.

3.  Hypertension.

4.  Hypothyroidism.

 

PAST SURGICAL HISTORY:   2, para 2.

 

MEDICATIONS:

1.  Amlodipine.

2.  Ascorbic acid.

3.  Potassium supplements.

4.  Metoprolol.

5.  Synthroid.

6.  Hydrochlorothiazide.

7.  Cholestyramine Light.

8.  Folic acid.

 

ALLERGIES:

1.  PENICILLIN WHICH CAUSES A RASH.

2.  ACE INHIBITORS WHICH CAUSE SEVERE COUGH.

 

SOCIAL HISTORY:  The patient lives by herself with her dog.  Her 

passed away a few years ago.  She is an RN and worked in several different

hospitals in the area and she is originally from [facility].  She

is independent with her activities of daily living.  She is supported by her

family very nicely.

 

FAMILY MEDICAL HISTORY:  The patient's mother  at 93 from "old age." 

Father  at age 75 after myocardial infarction.  She has a sister who she

is not close to and she is unsure if she has any medical problems.  Her

children are healthy as are her grandchildren and great grandchildren.

 

REVIEW OF SYSTEMS:  The patient weighs 97 pounds but states that she has not

lost any weight.  She wears glasses.  She denies any change in her hearing or

visual acuity in the last few months.  She has had no neurologic events such

as amaurosis fugax, transient ischemic attacks or focal weakness.  She denies

any seizures or significant headaches.  She does have dyspnea on exertion. 

She denies palpitations or chest pain prior to her spontaneous pneumothorax

this morning.  She denies nausea or vomiting or diarrhea.  She has had no

wound breakdown.  She does have some swelling of her lower legs which

interestingly enough she states is worse in the morning when she awakens and

after she has been up walking.

 

PHYSICAL EXAMINATION:

GENERAL:  This is a very pleasant 82-year-old who appears her stated age. 

She stands 5 feet 3 inches tall and weighs 105 pounds on the scale in the

Emergency Room.

HEENT:  She wears glasses.  Her extraocular movements are intact.  She has

bilateral arcus senilis.  Her sclerae are pale but anicteric.  She has no

nasolabial flattening.  She has had significant amount of dental work done

but she does not have full dentures.  I detect no oral mucosal lesions.

NECK:  Midline.  She has no neck vein distention.  She has no tracheal

deviation or carotid bruits.  I do not palpate any axillary, supraclavicular

or cervical lymph nodes.

LUNGS:  She has decreased breath sounds on both sides but no overt wheezing. 

I do not hear rales.

HEART:  Her heart sounds are distant but she has regular rate and rhythm of

her heart.

ABDOMEN:  Soft, scaphoid, flat, nontender.  No evidence of ascites or

abdominal aortic aneurysm.

EXTREMITIES:  She has good femoral pulses.  I have difficulty palpating her

pedal pulses but she has faint dorsalis pedis pulses bilaterally.  She has

trace edema of her lower legs but no hemosiderin deposition or

lipodermatosclerosis.  I detect no joint effusions.

NEUROLOGIC:  She is awake, alert and oriented.  She has no obvious focal

deficits.

 

ASSESSMENT AND PLAN:  Unchanging small to moderate left pneumothorax.  I had

a long talk with the patient and she understands chest tubes from her

occupation as a nurse.  She states quite frankly she would like to hold off

if possible.  At this point, I think being admitted to the medical service

and observed on oxygen is reasonable.  I have also explained that should she

deteriorate, we may end up putting a small chest tube in the middle of the

night.  She understands.  I will follow her and check a chest x-ray in the

morning.

 

Thank you very much. [Dear  ___] : Dear  [unfilled], [Consult Letter:] : I had the pleasure of evaluating your patient, [unfilled]. [Please see my note below.] : Please see my note below. [Consult Closing:] : Thank you very much for allowing me to participate in the care of this patient.  If you have any questions, please do not hesitate to contact me. [Sincerely,] : Sincerely, [DrMelyssa  ___] : Dr. KHAN [FreeTextEntry3] : Liu Arriaga MD, FACS

## 2022-01-13 NOTE — ADDENDUM
[FreeTextEntry1] : A portion of this note was written by [Felix Rosario] on 01/12/2022 acting as a scribe for Dr. Arriaga. \par \par I have personally reviewed the chart and agree that the record accurately reflects my personal performance of the history, physical exam, assessment, and plan. Patient/Caregiver provided printed discharge information.

## 2022-01-13 NOTE — PHYSICAL EXAM
[General Appearance - Well Developed] : well developed [General Appearance - Well Nourished] : well nourished [Normal Appearance] : normal appearance [Well Groomed] : well groomed [General Appearance - In No Acute Distress] : no acute distress [Abdomen Soft] : soft [Abdomen Tenderness] : non-tender [Abdomen Mass (___ Cm)] : no abdominal mass palpated [Costovertebral Angle Tenderness] : no ~M costovertebral angle tenderness [Epididymis] : the epididymides were normal [Testes Tenderness] : no tenderness of the testes [Testes Mass (___cm)] : there were no testicular masses [Edema] : no peripheral edema [] : no respiratory distress [Respiration, Rhythm And Depth] : normal respiratory rhythm and effort [Exaggerated Use Of Accessory Muscles For Inspiration] : no accessory muscle use [Oriented To Time, Place, And Person] : oriented to person, place, and time [Affect] : the affect was normal [Mood] : the mood was normal [Not Anxious] : not anxious [Normal Station and Gait] : the gait and station were normal for the patient's age [Urethral Meatus] : meatus normal [Penis Abnormality] : normal circumcised penis [Urinary Bladder Findings] : the bladder was normal on palpation [Skin Color & Pigmentation] : normal skin color and pigmentation [FreeTextEntry1] : Mild focal glans erythema.

## 2022-01-13 NOTE — ASSESSMENT
[FreeTextEntry1] : I discussed the findings and options with Mr. LIDA SEAY in detail and reviewed the CT scan.  Regarding the incidentally identified small left renal mass, I advised that he obtain an abdominal CT scan and a baseline renal sonogram in 6 months prior to returning to us.  He understands that although this is likely a malignant tumor, its small size and potentially slow growth allows us to safely follow it radiographically.  Surgical intervention may be required in the future.  Fortunately, today's cystoscopy was negative for any significant lower urinary tract pathology, although he does have a large friable prostate and a pendulous urethral stricture which was dilated.  \par \par \par

## 2022-01-17 LAB — BACTERIA UR CULT: NORMAL

## 2022-04-21 ENCOUNTER — APPOINTMENT (OUTPATIENT)
Dept: HEART AND VASCULAR | Facility: CLINIC | Age: 72
End: 2022-04-21
Payer: MEDICARE

## 2022-04-21 VITALS
DIASTOLIC BLOOD PRESSURE: 72 MMHG | HEART RATE: 67 BPM | BODY MASS INDEX: 28.61 KG/M2 | HEIGHT: 66 IN | SYSTOLIC BLOOD PRESSURE: 142 MMHG | RESPIRATION RATE: 16 BRPM | WEIGHT: 178 LBS | TEMPERATURE: 93.8 F | OXYGEN SATURATION: 97 %

## 2022-04-21 VITALS — SYSTOLIC BLOOD PRESSURE: 120 MMHG | DIASTOLIC BLOOD PRESSURE: 74 MMHG

## 2022-04-21 PROCEDURE — 99214 OFFICE O/P EST MOD 30 MIN: CPT

## 2022-04-21 PROCEDURE — 93000 ELECTROCARDIOGRAM COMPLETE: CPT

## 2022-04-21 NOTE — HISTORY OF PRESENT ILLNESS
[FreeTextEntry1] : 72 year male who comes for Cardiology followup. He is on Vitamin E, C and D. He report having a small renal cell tumor that is being followed by CT.  He denies having any chest pain, SOB, GREENFIELD, dizziness, palpitations, orthopnea, PND or syncope

## 2022-04-21 NOTE — ASSESSMENT
[FreeTextEntry1] : An EKG was performed to evaluate for arrhythmia and ischemia. \par \par CAD-- I encouraged continued risk factor reduction and gradual increase in aerobic activity as tolerated

## 2022-08-05 ENCOUNTER — NON-APPOINTMENT (OUTPATIENT)
Age: 72
End: 2022-08-05

## 2022-10-12 ENCOUNTER — APPOINTMENT (OUTPATIENT)
Dept: HEART AND VASCULAR | Facility: CLINIC | Age: 72
End: 2022-10-12

## 2022-10-12 VITALS — BODY MASS INDEX: 28.61 KG/M2 | WEIGHT: 178 LBS | HEIGHT: 66 IN

## 2022-10-12 DIAGNOSIS — E78.5 HYPERLIPIDEMIA, UNSPECIFIED: ICD-10-CM

## 2022-10-12 PROCEDURE — 99213 OFFICE O/P EST LOW 20 MIN: CPT | Mod: 25

## 2022-10-12 PROCEDURE — 93015 CV STRESS TEST SUPVJ I&R: CPT

## 2022-10-12 PROCEDURE — ZZZZZ: CPT

## 2022-10-12 PROCEDURE — A9500: CPT

## 2022-10-12 PROCEDURE — 78452 HT MUSCLE IMAGE SPECT MULT: CPT

## 2022-10-12 NOTE — HISTORY OF PRESENT ILLNESS
[FreeTextEntry1] : 72 year male who comes for Cardiology followup of his CAD. He having psyllium and statin. He walking but not formally exercising. He is anticipating needing more Urological procedures. He denies having any chest pain, SOB, GREENFIELD, dizziness, palpitations, orthopnea, PND or syncope

## 2022-10-12 NOTE — ASSESSMENT
[FreeTextEntry1] : At the time of the patient's visit a Nuclear Stress Test was performed to evaluate for exercise induced arrhythmia and ischemia. At the time of the visit the results were reviewed with patient \par \par I encouraged continued risk factor reduction and gradual increase in aerobic activity as tolerated \par \par CAD-- I encouraged continued risk factor reduction and gradual increase in aerobic activity as tolerated. Continue beta blocker and statin\par \par I informed the patient that I did not find a Cardiovascular contraindication to Urological surgery at this time \par \par 20   minutes were spent discussing cardiac risk excluding procedure time

## 2022-10-13 ENCOUNTER — APPOINTMENT (OUTPATIENT)
Dept: UROLOGY | Facility: CLINIC | Age: 72
End: 2022-10-13

## 2022-10-13 DIAGNOSIS — Z87.448 PERSONAL HISTORY OF OTHER DISEASES OF URINARY SYSTEM: ICD-10-CM

## 2022-10-13 LAB
BILIRUB UR QL STRIP: NORMAL
CLARITY UR: CLEAR
COLLECTION METHOD: NORMAL
GLUCOSE UR-MCNC: NORMAL
HCG UR QL: 0.2 EU/DL
HGB UR QL STRIP.AUTO: NORMAL
KETONES UR-MCNC: NORMAL
LEUKOCYTE ESTERASE UR QL STRIP: NORMAL
NITRITE UR QL STRIP: NORMAL
PH UR STRIP: 5.5
PROT UR STRIP-MCNC: 100
SP GR UR STRIP: 1.03

## 2022-10-13 PROCEDURE — 76857 US EXAM PELVIC LIMITED: CPT

## 2022-10-13 PROCEDURE — 99215 OFFICE O/P EST HI 40 MIN: CPT

## 2022-10-13 NOTE — ASSESSMENT
[FreeTextEntry1] : I discussed the findings and options with Mr. LIDA HOFFMAN in detail and reviewed the CT scan and renal ultrasound (and provided him with a copy for his records).\par \par The left renal mass has grown approximately 15% in the last twelve months. We reviewed the possible underlying histology of solid enhancing renal masses, with the majority being malignant (~80%) whereas ~20% are benign (e.g. oncocytoma). We discussed the role/possibility of percutaneous biopsy, with the associated risks, benefits, complications, and accuracy issues (e.g. risk of false negative results).\par \par The heterogeneous natural history/biology of renal cell carcinoma was discussed, including the fact that while many renal cancers are indolent, others behave aggressively. Options were reviewed including, not limited to, active surveillance (AS), surgical extirpation and ablation. The risks of tumor growth and metastasis on active surveillance were reviewed, including the fact that metastatic progression on AS could mean missing the opportunity for curative therapy. The average growth rate of ~2-3 mm/year and metastasis rates of ~2-3% on AS over 5 year interval for small renal masses <4 cm was discussed. \par \par Mr. Hoffman would like to proceed with active surveillance, which would involve a renal sonogram to be performed in January 2023. I will call him once I see the result. \par \par No intervention is warranted for his stable lower urinary tract symptoms.\par \par Providing there are no new problems and the followup renal sonogram is stable, I look forward to seeing Mr. Hoffman in one year (sono). He should have a PSA in August 2023.

## 2022-10-13 NOTE — HISTORY OF PRESENT ILLNESS
[FreeTextEntry1] : Mr. LIDA HOFFMAN comes in today for his urologic follow-up. A CT scan ordered in December 2021 to evaluate gross hematuria identified a 1.3 solid left upper pole enhancing renal lesion (see below). A follow-up CT scan identified growth in the lesion; in its largest dimension it now measures 1.5cm (see below).\par \par From his general urologic history, Mr. Hoffman reports moderate stable lower urinary tract symptoms (obstructive and irritative) with nocturia x 2.\par IPSS: 11/35\par Sono (performed to assess bladder emptying): 75cc PVR; Prostate 49cc\par \par His erections are reportedly normal.\par \par Pelvic US:  3/30/21--76cc prostate. 115cc PVR. Bladder diverticulum\par \par Creat:  11/8/21--0.62; 4/16/21--0.75mg/dl\par \par PSA:  11/8/21--1.23; 4/13/21--1.34; 2/4/21--1.4; 6/3/20--1.64; 7/15/19--1.37; 4/12/18--1.39; 1/24/17--0.96; 4/13/16--1.69; 5/19/15--1.35\par \par CT: 7/21/22--Enlarging lesion (1.5 x 1.5 x 1.0cm) in the anterior upper pole of the left kidney concerning for a renal neoplasm. Enhancing lesion at the dome of the right lobe of the liver; 12/23/21--1.1 x 0.6 x 1.3cm cystic and solid left upper pole enhancing renal lesion without internal fat or calcification may represent a small renal cell carcinoma or oncocytoma; \par \par Renal US: 7/21/22--Solid hypoechoic 1.7cm lesion in the anterior mid to lower pole left kidney, slightly increased in size since the prior CT scan.  Suspicious for renal neoplasm;\par

## 2022-10-13 NOTE — PHYSICAL EXAM
[General Appearance - Well Developed] : well developed [General Appearance - Well Nourished] : well nourished [Normal Appearance] : normal appearance [Well Groomed] : well groomed [General Appearance - In No Acute Distress] : no acute distress [Abdomen Soft] : soft [Abdomen Tenderness] : non-tender [Abdomen Mass (___ Cm)] : no abdominal mass palpated [Costovertebral Angle Tenderness] : no ~M costovertebral angle tenderness [Urethral Meatus] : meatus normal [Penis Abnormality] : normal circumcised penis [Urinary Bladder Findings] : the bladder was normal on palpation [Epididymis] : the epididymides were normal [Testes Tenderness] : no tenderness of the testes [Testes Mass (___cm)] : there were no testicular masses [Skin Color & Pigmentation] : normal skin color and pigmentation [Edema] : no peripheral edema [] : no respiratory distress [Respiration, Rhythm And Depth] : normal respiratory rhythm and effort [Exaggerated Use Of Accessory Muscles For Inspiration] : no accessory muscle use [Oriented To Time, Place, And Person] : oriented to person, place, and time [Affect] : the affect was normal [Mood] : the mood was normal [Not Anxious] : not anxious [Normal Station and Gait] : the gait and station were normal for the patient's age [Heart Rate And Rhythm] : Heart rate and rhythm were normal [No Focal Deficits] : no focal deficits [No Palpable Adenopathy] : no palpable adenopathy [FreeTextEntry1] : Mild focal glans erythema.

## 2022-10-13 NOTE — ADDENDUM
[FreeTextEntry1] : A portion of this note was written by [Feilx Rosario] on 10/12/2022 acting as a scribe for Dr. Arriaga. \par \par I have personally reviewed the chart and agree that the record accurately reflects my personal performance of the history, physical exam, assessment, and plan.

## 2022-10-16 LAB — BACTERIA UR CULT: NORMAL

## 2022-10-17 LAB — URINE CYTOLOGY: NORMAL

## 2023-01-30 ENCOUNTER — NON-APPOINTMENT (OUTPATIENT)
Age: 73
End: 2023-01-30

## 2023-02-10 ENCOUNTER — APPOINTMENT (OUTPATIENT)
Dept: UROLOGY | Facility: CLINIC | Age: 73
End: 2023-02-10
Payer: MEDICARE

## 2023-02-10 VITALS
HEART RATE: 67 BPM | BODY MASS INDEX: 28.93 KG/M2 | TEMPERATURE: 96.8 F | SYSTOLIC BLOOD PRESSURE: 150 MMHG | DIASTOLIC BLOOD PRESSURE: 78 MMHG | HEIGHT: 66 IN | OXYGEN SATURATION: 97 % | WEIGHT: 180 LBS

## 2023-02-10 DIAGNOSIS — R31.29 OTHER MICROSCOPIC HEMATURIA: ICD-10-CM

## 2023-02-10 PROCEDURE — 99214 OFFICE O/P EST MOD 30 MIN: CPT

## 2023-02-10 NOTE — ASSESSMENT
[FreeTextEntry1] : I discussed the findings and options with Mr. LIDA SEAY in detail and reviewed the CT scan and renal ultrasound (and provided him with a copy for his records).  Since the mass was not identified he had a renal sonogram did not use this modality for follow-up.  We agreed that he would repeat an MRI of the abdomen in late June and follow-up once this has been done.\par \par Again, he understands that while many renal cancers are indolent, others behave aggressively. Options were reviewed including, not limited to, active surveillance (AS), surgical extirpation and ablation. The risks of tumor growth and metastasis on active surveillance were reviewed, including the fact that metastatic progression on AS could mean missing the opportunity for curative therapy. The average growth rate of ~2-3 mm/year and metastasis rates of ~2-3% on AS over 5 year interval for small renal masses <4 cm was discussed. \par \par No intervention is warranted for his stable lower urinary tract symptoms.\par \par

## 2023-02-10 NOTE — ADDENDUM
[FreeTextEntry1] : A portion of this note was written by [Felix Rosario] on 02/09/2023 acting as a scribe for Dr. Arriaga. \par \par I have personally reviewed the chart and agree that the record accurately reflects my personal performance of the history, physical exam, assessment, and plan.

## 2023-02-10 NOTE — PHYSICAL EXAM
[General Appearance - Well Developed] : well developed [General Appearance - Well Nourished] : well nourished [Normal Appearance] : normal appearance [Well Groomed] : well groomed [General Appearance - In No Acute Distress] : no acute distress [Abdomen Soft] : soft [Abdomen Tenderness] : non-tender [Abdomen Mass (___ Cm)] : no abdominal mass palpated [Costovertebral Angle Tenderness] : no ~M costovertebral angle tenderness [Urethral Meatus] : meatus normal [Penis Abnormality] : normal circumcised penis [Urinary Bladder Findings] : the bladder was normal on palpation [Epididymis] : the epididymides were normal [Testes Tenderness] : no tenderness of the testes [Testes Mass (___cm)] : there were no testicular masses [Skin Color & Pigmentation] : normal skin color and pigmentation [Heart Rate And Rhythm] : Heart rate and rhythm were normal [Edema] : no peripheral edema [] : no respiratory distress [Respiration, Rhythm And Depth] : normal respiratory rhythm and effort [Exaggerated Use Of Accessory Muscles For Inspiration] : no accessory muscle use [Oriented To Time, Place, And Person] : oriented to person, place, and time [Affect] : the affect was normal [Mood] : the mood was normal [Not Anxious] : not anxious [Normal Station and Gait] : the gait and station were normal for the patient's age [No Focal Deficits] : no focal deficits [No Palpable Adenopathy] : no palpable adenopathy [FreeTextEntry1] : Mild focal glans erythema.

## 2023-02-10 NOTE — HISTORY OF PRESENT ILLNESS
[FreeTextEntry1] : Mr. LIDA HOFFMAN comes in today for his urologic follow-up. \par \par A CT scan ordered in December 2021 to evaluate gross hematuria identified a 1.3 solid left upper pole enhancing renal lesion (see below). A follow-up CT scan in July 2022 identified growth in the lesion to 1.5 cm.  A renal sonogram performed on January 27, 2023, to further evaluate this cyst did not make mention of its size as it was not adequately visualized (see below).\par \par From his general urologic history, Mr. Hoffman reports moderate stable lower urinary tract symptoms (obstructive and irritative) with nocturia x 2.\par Sono (performed to assess bladder emptying): 11cc PVR\par \par His erections are reportedly normal.\par \par Pelvic US:  3/30/21--76cc prostate. 115cc PVR. Bladder diverticulum\par \par Creat:  11/8/21--0.62; 4/16/21--0.75mg/dl\par \par PSA:  12/5/22--1.73; 11/8/21--1.23; 4/13/21--1.34; 2/4/21--1.4; 6/3/20--1.64; 7/15/19--1.37; 4/12/18--1.39; 1/24/17--0.96; 4/13/16--1.69; 5/19/15--1.35\par \par CT: 7/21/22--Enlarging lesion (1.5 x 1.5 x 1.0cm) in the anterior upper pole of the left kidney concerning for a renal neoplasm. Enhancing lesion at the dome of the right lobe of the liver; 12/23/21--1.1 x 0.6 x 1.3cm cystic and solid left upper pole enhancing renal lesion without internal fat or calcification may represent a small renal cell carcinoma or oncocytoma; \par \par Renal US: 1/27/23--Bilateral renal cysts.  No hydronephrosis.  No solid renal mass; 7/21/22--Solid hypoechoic 1.7cm lesion in the anterior mid to lower pole left kidney, slightly increased in size since the prior CT scan.  Suspicious for renal neoplasm;\par

## 2023-04-12 ENCOUNTER — APPOINTMENT (OUTPATIENT)
Dept: HEART AND VASCULAR | Facility: CLINIC | Age: 73
End: 2023-04-12
Payer: MEDICARE

## 2023-04-12 VITALS
DIASTOLIC BLOOD PRESSURE: 78 MMHG | OXYGEN SATURATION: 97 % | HEIGHT: 66 IN | BODY MASS INDEX: 28.28 KG/M2 | SYSTOLIC BLOOD PRESSURE: 147 MMHG | TEMPERATURE: 98.7 F | WEIGHT: 176 LBS | HEART RATE: 72 BPM

## 2023-04-12 VITALS — DIASTOLIC BLOOD PRESSURE: 72 MMHG | SYSTOLIC BLOOD PRESSURE: 144 MMHG

## 2023-04-12 PROCEDURE — 93000 ELECTROCARDIOGRAM COMPLETE: CPT

## 2023-04-12 PROCEDURE — 99213 OFFICE O/P EST LOW 20 MIN: CPT

## 2023-04-12 RX ORDER — LEVOTHYROXINE SODIUM 0.11 MG/1
112 TABLET ORAL
Qty: 90 | Refills: 0 | Status: ACTIVE | COMMUNITY
Start: 2023-04-12

## 2023-04-12 NOTE — HISTORY OF PRESENT ILLNESS
[FreeTextEntry1] : 73 year male who comes for Cardiology followup of his CAD. We reviewed his recent labs with Lipids at goal. He denies having any chest pain, SOB, GREENFIELD, dizziness, palpitations, orthopnea, PND or syncope

## 2023-04-12 NOTE — REVIEW OF SYSTEMS
Have Your Skin Lesions Been Treated?: not been treated Is This A New Presentation, Or A Follow-Up?: Skin Lesion How Severe Is Your Skin Lesion?: mild [Negative] : Cardiovascular

## 2023-04-12 NOTE — ASSESSMENT
[FreeTextEntry1] : An EKG was performed to evaluate for arrhythmia and ischemia.\par \par Labs reviewed and at goal\par \par CAD-- to continue statin, ASA and beta blocker\par \par Hypertension--We discussed a goal of /80 or lower in the office. BP  above  goal\par \par I suggested increase in Metoprolol to 50 mg bid\par \par I encouraged continued risk factor reduction and gradual increase in aerobic activity as tolerated\par \par 30   minutes were spent discussing cardiac risk excluding procedure time \par \par

## 2023-07-13 ENCOUNTER — RESULT CHARGE (OUTPATIENT)
Age: 73
End: 2023-07-13

## 2023-07-13 ENCOUNTER — APPOINTMENT (OUTPATIENT)
Dept: UROLOGY | Facility: CLINIC | Age: 73
End: 2023-07-13
Payer: MEDICARE

## 2023-07-13 VITALS — HEART RATE: 62 BPM | OXYGEN SATURATION: 95 % | SYSTOLIC BLOOD PRESSURE: 150 MMHG | DIASTOLIC BLOOD PRESSURE: 75 MMHG

## 2023-07-13 LAB
BILIRUB UR QL STRIP: NORMAL
CLARITY UR: CLEAR
COLLECTION METHOD: NORMAL
GLUCOSE UR-MCNC: NORMAL
HCG UR QL: 0.2 EU/DL
HGB UR QL STRIP.AUTO: NORMAL
KETONES UR-MCNC: NORMAL
LEUKOCYTE ESTERASE UR QL STRIP: NORMAL
NITRITE UR QL STRIP: NORMAL
PH UR STRIP: 6
PROT UR STRIP-MCNC: NORMAL
SP GR UR STRIP: >=1.03

## 2023-07-13 PROCEDURE — 99215 OFFICE O/P EST HI 40 MIN: CPT

## 2023-07-13 PROCEDURE — 51798 US URINE CAPACITY MEASURE: CPT

## 2023-07-13 NOTE — ASSESSMENT
[FreeTextEntry1] : I discussed the findings and options with Mr. LIDA HOFFMAN in detail and reviewed the abdominal MRI (and provided him with a copy for his records). The left renal mass has increased significantly (6mm in the past year, allowing for inter-imaging variability).  I reviewed all the options with him, including continuing with active surveillance or proceeding with intervention with either a robotic assisted laparoscopic partial nephrectomy or percutaneous cryotherapy. The pros and cons of each approach were outlined.\par \par Mr. Hoffman is favoring the most minimally invasive approach and will see Dr. Raheem Campbell in consultation to discuss this.  \par \par \par

## 2023-07-13 NOTE — HISTORY OF PRESENT ILLNESS
[FreeTextEntry1] : Mr. LIDA HOFFMAN comes in today for his semiannual urologic follow-up. \par \par A CT scan ordered in December 2021 to evaluate gross hematuria identified a 1.3 solid left upper pole enhancing renal lesion (see below). It has progressively enlarged  and is currently 2.1 cm.  He denies any hematuria or systemic complaints.\par \par From his general urologic history, Mr. Hoffman reports moderate stable lower urinary tract symptoms (obstructive and irritative) with nocturia x 2.\par IPSS: 11/2\par Sono (performed to assess bladder emptying):  cc \par \par His erections are reportedly normal.\par \par CT: 7/21/22--Enlarging lesion (1.5 x 1.5 x 1.0cm) in the anterior upper pole of the left kidney concerning for a renal neoplasm. Enhancing lesion at the dome of the right lobe of the liver; 12/23/21--1.1 x 0.6 x 1.3cm cystic and solid left upper pole enhancing renal lesion without internal fat or calcification may represent a small renal cell carcinoma or oncocytoma; \par \par Renal US: 1/27/23--Bilateral renal cysts.  No hydronephrosis.  No solid renal mass; 7/21/22--Solid hypoechoic 1.7cm lesion in the anterior mid to lower pole left kidney, slightly increased in size since the prior CT scan.  Suspicious for renal neoplasm;\par \par MRI Abdomen: 6/30/23--2.1 cm enhancing left renal mass (previously 1.5 cm), suspicious for renal cell carcinoma, no local lymphadenopathy or venous invasion, bilateral renal cysts, hepatic hemangiomas and cysts\par \par Pelvic US:  3/30/21--76cc prostate. 115cc PVR. Bladder diverticulum\par \par Creat: 3/8/23--0.76;  11/8/21--0.62; 4/16/21--0.75mg/dl\par \par PSA:  3/8/23--1.46; 12/5/22--1.73; 11/8/21--1.23; 4/13/21--1.34; 2/4/21--1.4; 6/3/20--1.64; 7/15/19--1.37; 4/12/18--1.39; 1/24/17--0.96; 4/13/16--1.69; 5/19/15--1.35\par \par

## 2023-07-13 NOTE — LETTER BODY
[Dear  ___] : Dear  [unfilled], [Consult Letter:] : I had the pleasure of evaluating your patient, [unfilled]. [Please see my note below.] : Please see my note below. [Consult Closing:] : Thank you very much for allowing me to participate in the care of this patient.  If you have any questions, please do not hesitate to contact me. [Sincerely,] : Sincerely, [DrMelyssa  ___] : Dr. KHAN [DrMelyssa ___] : Dr. KHAN [FreeTextEntry3] : Liu Arriaga MD, FACS

## 2023-08-30 ENCOUNTER — APPOINTMENT (OUTPATIENT)
Dept: INTERVENTIONAL RADIOLOGY/VASCULAR | Facility: HOSPITAL | Age: 73
End: 2023-08-30

## 2023-08-30 PROCEDURE — XXXXX: CPT | Mod: 1L

## 2023-09-01 VITALS
TEMPERATURE: 98.8 F | HEART RATE: 80 BPM | DIASTOLIC BLOOD PRESSURE: 71 MMHG | SYSTOLIC BLOOD PRESSURE: 157 MMHG | OXYGEN SATURATION: 98 %

## 2023-09-01 NOTE — PHYSICAL EXAM
[Alert] : alert [No Acute Distress] : no acute distress [EOMI] : extra occular movement intact [Normal Outer Ear/Nose] : the ears and nose were normal in appearance [No Respiratory Distress] : no respiratory distress [No Accessory Muscle Use] : no accessory muscle use [Clear to Auscultation] : lungs were clear to auscultation bilaterally [Normal Rate] : heart rate was normal  [Regular Rhythm] : with a regular rhythm [Femoral Arteries Normal] : femoral pulses were normal without bruits [Not Tender] : non-tender [Soft] : abdomen soft [Not Distended] : not distended [Normal Gait] : normal gait [No Involuntary Movements] : no involuntary movements were seen [No Rash] : no rash [No Motor Deficits] : the motor exam was normal [Oriented x3] : oriented to person, place, and time [Normal Insight/Judgement] : insight and judgment were intact [Fully active, able to carry on all pre-disease performance without restriction] : Fully active, able to carry on all pre-disease performance without restriction

## 2023-09-01 NOTE — HISTORY OF PRESENT ILLNESS
[FreeTextEntry1] : 73 year old male referred to IR for consultation regarding cryoablation of left renal mass. Patient initially presented with hematuria ~2021, which prompted imaging revealing a left renal mass. Aforementioned mass has demonstrated interval growth to 2.1 cm on recent MRI with features consistent with RCC. He denies any new or associated symptoms. He denies any prior related surgery or intervention. The technical aspects of cryoablation were discussed. The risks, including and not limited to bleeding, infection, nerve damage, off-target ablation were discussed. Patient demonstrated understanding and asked appropriate questions. Of note, the renal lesion demonstrates proximity to adjacent bowel, which was discussed with the patient - including the possibility for hydrodisection. Plan to proceed with cryoablation of left renal mass.

## 2023-09-01 NOTE — ASSESSMENT
[FreeTextEntry1] : 73 year old male referred to IR for consultation regarding cryoablation of left renal mass. Recent MRI demonstrates interval enlargement of aforementioned left renal mass with features suggestive of RCC. Plan to proceed with cryoablation.

## 2023-09-01 NOTE — CONSULT LETTER
Dragan Prasad is a 80 year old year old male patient here today for evaluation and managment of basal cell carcinoma on right ala.  Patient has no other skin complaints today.  Remainder of the HPI, Meds, PMH, Allergies, FH, and SH was reviewed in chart.      Past Medical History:   Diagnosis Date     Basal cell carcinoma      Diverticula of colon      Respiratory complications        Past Surgical History:   Procedure Laterality Date     ARTHROSCOPY OF JOINT UNLISTED      right knee about      C APPENDECTOMY       SURGICAL HISTORY OF -   02    Basal cell carcinoma w/positive margins, right  eyebrow & eyelid        Family History   Problem Relation Age of Onset     Cancer Mother      lung cancer     C.A.D. Father      uncertain.  age 79     Unknown/Adopted Maternal Grandmother      Unknown/Adopted Maternal Grandfather      Unknown/Adopted Paternal Grandmother      Unknown/Adopted Paternal Grandfather      Diabetes Brother      Dre     C.A.D. Brother      Dre  age 64       Social History     Social History     Marital status:      Spouse name: N/A     Number of children: N/A     Years of education: N/A     Occupational History     contracter Self     Social History Main Topics     Smoking status: Former Smoker     Types: Cigarettes     Quit date: 1986     Smokeless tobacco: Never Used     Alcohol use No     Drug use: No     Sexual activity: No     Other Topics Concern     Not on file     Social History Narrative       Outpatient Encounter Prescriptions as of 2018   Medication Sig Dispense Refill     ASPIRIN 325 MG PO TBEC ONE DAILY  3     No facility-administered encounter medications on file as of 2018.              Review Of Systems  Skin: As above  Eyes: negative  Ears/Nose/Throat: negative  Respiratory: No shortness of breath, dyspnea on exertion, cough, or hemoptysis  Cardiovascular: negative  Gastrointestinal: negative  Genitourinary: negative  Musculoskeletal:  "negative  Neurologic: negative  Psychiatric: negative  Hematologic/Lymphatic/Immunologic: negative  Endocrine: negative      O:   NAD, WDWN, Alert & Oriented, Mood & Affect wnl, Vitals stable   Here today alone   BP (!) 157/94  Pulse 59  Ht 1.803 m (5' 11\")   General appearance normal   Vitals stable   Alert, oriented and in no acute distress     R ala 5mm red scaly papule       Eyes: Conjunctivae/lids:Normal     ENT: Lips, buccal mucosa, tongue: normal    MSK:Normal    Cardiovascular: peripheral edema none    Pulm: Breathing Normal    Neuro/Psych: Orientation:Normal; Mood/Affect:Normal      A/P:  1. R ala basal cell carcinoma   MOHS:   Location    After PGACAC discussed with patient, decision for Mohs surgery was made. Indication for Mohs was Location. Patient confirmed the site with Dr. Wadsworth.  After anesthesia with LEC, the tumor was excised using standard Mohs technique in 2 stages(s).  CLEAR MARGINS OBTAINED and Final defect size was 1.2 cm.     DERMABRASION: After PGACAC discussed with patient, decision for tangential excision and dermabrasion was made. After anesthesia with Lido/Epi/Clinda and prep with hibiclens, hypertrophic areas were tangentially excised and entire cosmetic unit was smoothed 2.2cm. Hemostasis was obtained with pressure. Patient tolerated procedure well. There were no complications and EBL minimal. Patient advised to keep abraded surfaces covered with generous ointment until healed, approximately 2 weeks. Return to office in 3 months or prn.        BENIGN LESIONS DISCUSSED WITH PATIENT:  I discussed the specifics of tumor, prognosis, and genetics of benign lesions.  I explained that treatment of these lesions would be purely cosmetic and not medically neccessary.  I discussed with patient different removal options including excision, cautery and /or laser.      Nature and genetics of benign skin lesions dicussed with patient.  Signs and Symptoms of skin cancer discussed with " [Dear  ___] : Dear  [unfilled], [Consult Letter:] : I had the pleasure of evaluating your patient, [unfilled]. patient.  Patient encouraged to perform monthly skin exams.  UV precautions reviewed with patient.  Patient to follow up with Primary Care provider regarding elevated blood pressure.  Skin care regimen reviewed with patient: Eliminate harsh soaps, i.e. Dial, zest, irsih spring; Mild soaps such as Cetaphil or Dove sensitive skin, avoid hot or cold showers, aggressive use of emollients including vanicream, cetaphil or cerave discussed with patient.    Risks of non-melanoma skin cancer discussed with patient   Return to clinic 6 months  Patient to follow up with Primary Care provider regarding elevated blood pressure.       [( Thank you for referring [unfilled] for consultation for _____ )] : Thank you for referring [unfilled] for consultation for [unfilled]

## 2023-09-21 ENCOUNTER — APPOINTMENT (OUTPATIENT)
Dept: HEART AND VASCULAR | Facility: CLINIC | Age: 73
End: 2023-09-21
Payer: MEDICARE

## 2023-09-21 ENCOUNTER — NON-APPOINTMENT (OUTPATIENT)
Age: 73
End: 2023-09-21

## 2023-09-21 VITALS
BODY MASS INDEX: 27.64 KG/M2 | HEIGHT: 66 IN | DIASTOLIC BLOOD PRESSURE: 62 MMHG | TEMPERATURE: 98.1 F | SYSTOLIC BLOOD PRESSURE: 124 MMHG | HEART RATE: 59 BPM | OXYGEN SATURATION: 99 % | WEIGHT: 172.01 LBS | RESPIRATION RATE: 16 BRPM

## 2023-09-21 PROCEDURE — 93000 ELECTROCARDIOGRAM COMPLETE: CPT | Mod: NC

## 2023-09-21 PROCEDURE — 99214 OFFICE O/P EST MOD 30 MIN: CPT

## 2023-10-10 ENCOUNTER — RESULT REVIEW (OUTPATIENT)
Age: 73
End: 2023-10-10

## 2023-10-10 ENCOUNTER — APPOINTMENT (OUTPATIENT)
Dept: CT IMAGING | Facility: HOSPITAL | Age: 73
End: 2023-10-10

## 2023-10-10 ENCOUNTER — APPOINTMENT (OUTPATIENT)
Dept: INTERVENTIONAL RADIOLOGY/VASCULAR | Facility: HOSPITAL | Age: 73
End: 2023-10-10

## 2023-10-10 ENCOUNTER — NON-APPOINTMENT (OUTPATIENT)
Age: 73
End: 2023-10-10

## 2023-10-10 ENCOUNTER — OUTPATIENT (OUTPATIENT)
Dept: OUTPATIENT SERVICES | Facility: HOSPITAL | Age: 73
LOS: 1 days | End: 2023-10-10
Payer: MEDICARE

## 2023-10-10 VITALS
HEART RATE: 61 BPM | OXYGEN SATURATION: 100 % | SYSTOLIC BLOOD PRESSURE: 119 MMHG | TEMPERATURE: 99 F | WEIGHT: 175.05 LBS | RESPIRATION RATE: 16 BRPM | DIASTOLIC BLOOD PRESSURE: 70 MMHG | HEIGHT: 65 IN

## 2023-10-10 DIAGNOSIS — D49.512 NEOPLASM OF UNSPECIFIED BEHAVIOR OF LEFT KIDNEY: ICD-10-CM

## 2023-10-10 PROCEDURE — 50593 PERC CRYO ABLATE RENAL TUM: CPT

## 2023-10-10 PROCEDURE — 77013 CT GUIDE FOR TISSUE ABLATION: CPT | Mod: 26

## 2023-10-10 PROCEDURE — C2618: CPT

## 2023-10-10 PROCEDURE — 77013 CT GUIDE FOR TISSUE ABLATION: CPT

## 2023-10-10 NOTE — PRE-ANESTHESIA EVALUATION ADULT - NSANTHPMHFT_GEN_ALL_CORE
Pmhx: HTN, High cholesterol, CAD s/p stent 2010 hypothyroidism  Pshx: tonsillectomy, cardiac stent, vitrectomy, cataract

## 2023-10-18 ENCOUNTER — APPOINTMENT (OUTPATIENT)
Dept: HEART AND VASCULAR | Facility: CLINIC | Age: 73
End: 2023-10-18

## 2023-10-25 ENCOUNTER — APPOINTMENT (OUTPATIENT)
Dept: UROLOGY | Facility: CLINIC | Age: 73
End: 2023-10-25
Payer: MEDICARE

## 2023-10-25 VITALS — SYSTOLIC BLOOD PRESSURE: 180 MMHG | DIASTOLIC BLOOD PRESSURE: 75 MMHG | OXYGEN SATURATION: 97 % | HEART RATE: 70 BPM

## 2023-10-25 PROCEDURE — 99215 OFFICE O/P EST HI 40 MIN: CPT

## 2023-10-25 PROCEDURE — 51798 US URINE CAPACITY MEASURE: CPT

## 2024-02-09 PROBLEM — R33.9 URINARY RETENTION: Status: ACTIVE | Noted: 2021-07-08

## 2024-02-09 PROBLEM — R39.9 LOWER URINARY TRACT SYMPTOMS: Status: ACTIVE | Noted: 2021-07-08

## 2024-02-09 PROBLEM — K40.90 INGUINAL HERNIA, LEFT: Status: ACTIVE | Noted: 2021-07-08

## 2024-02-12 ENCOUNTER — APPOINTMENT (OUTPATIENT)
Dept: UROLOGY | Facility: CLINIC | Age: 74
End: 2024-02-12
Payer: MEDICARE

## 2024-02-12 VITALS
SYSTOLIC BLOOD PRESSURE: 176 MMHG | HEART RATE: 71 BPM | OXYGEN SATURATION: 95 % | HEIGHT: 66 IN | DIASTOLIC BLOOD PRESSURE: 92 MMHG

## 2024-02-12 DIAGNOSIS — R39.9 UNSPECIFIED SYMPTOMS AND SIGNS INVOLVING THE GENITOURINARY SYSTEM: ICD-10-CM

## 2024-02-12 DIAGNOSIS — K40.90 UNILATERAL INGUINAL HERNIA, W/OUT OBSTRUCTION OR GANGRENE, NOT SPECIFIED AS RECURRENT: ICD-10-CM

## 2024-02-12 DIAGNOSIS — R33.9 RETENTION OF URINE, UNSPECIFIED: ICD-10-CM

## 2024-02-12 PROCEDURE — 51798 US URINE CAPACITY MEASURE: CPT

## 2024-02-12 PROCEDURE — 99215 OFFICE O/P EST HI 40 MIN: CPT

## 2024-02-12 RX ORDER — ALFUZOSIN HYDROCHLORIDE 10 MG/1
10 TABLET, EXTENDED RELEASE ORAL DAILY
Qty: 90 | Refills: 3 | Status: ACTIVE | COMMUNITY
Start: 2024-02-12 | End: 1900-01-01

## 2024-02-12 NOTE — HISTORY OF PRESENT ILLNESS
[FreeTextEntry1] : Mr. LIDA SEAY comes in today for followup. He reports moderate stable lower urinary tract symptoms (obstructive and irritative) with nocturia x 2. IPSS: 14/2 Sono (performed to assess bladder emptying):  244cc PVR (PVRs; 178cc Oct 2023; 157cc, July 2023)   A CT scan ordered in December 2021 to evaluate gross hematuria identified a 1.3 solid left upper pole enhancing renal lesion (see below). It has progressively enlarged and is currently 2.6 cm.  He denies any hematuria or systemic complaints. An attempt at cryoablation by IR was terminated due to extensive intervening bowel.  He denies any systemic complaints.  His erections are reportedly normal.  CT: 7/21/22--Enlarging lesion (1.5 x 1.5 x 1.0cm) in the anterior upper pole of the left kidney concerning for a renal neoplasm. Enhancing lesion at the dome of the right lobe of the liver; 12/23/21--1.1 x 0.6 x 1.3cm cystic and solid left upper pole enhancing renal lesion without internal fat or calcification may represent a small renal cell carcinoma or oncocytoma;   Renal US: 1/27/23--Bilateral renal cysts.  No hydronephrosis.  No solid renal mass; 7/21/22--Solid hypoechoic 1.7cm lesion in the anterior mid to lower pole left kidney, slightly increased in size since the prior CT scan.  Suspicious for renal neoplasm;  MRI Abdomen: 1/10/24-Mixed solid and cystic enhancing mass measuring 2.6 x 1.8 cm in the left kidney (increased from 2.1 cm); -6/30/23--2.1 cm enhancing left renal mass (previously 1.5 cm), suspicious for renal cell carcinoma, no local lymphadenopathy or venous invasion, bilateral renal cysts, hepatic hemangiomas and cysts  Pelvic US:  3/30/21--76cc prostate. 115cc PVR. Bladder diverticulum  Creat: 9/13/23--0.59; 3/8/23--0.76; 11/8/21--0.62; 4/16/21--0.75mg/dl  PSA:  9/13/23--1.05; 3/8/23--1.46; 12/5/22--1.73; 11/8/21--1.23; 4/13/21--1.34; 2/4/21--1.4; 6/3/20--1.64; 7/15/19--1.37; 4/12/18--1.39; 1/24/17--0.96; 4/13/16--1.69; 5/19/15--1.35

## 2024-02-12 NOTE — ASSESSMENT
[FreeTextEntry1] : I discussed the findings and options with . LIDA HOFFMAN in detail and reviewed the recent CT scan which continues to show a progressive increase in the size of the left renal mass.  For this reason, I advised that he consider surgical intervention with the best approach being a laparoscopic/robotic partial nephrectomy/enucleation.  I described the procedure to him including the risks and benefits.  Regarding the worsening urinary retention, I advised that he begin alfuzosin and I counseled him regarding its potential side effects.  I would like Mr. Hoffman to see Dr. Lima in our office to further discuss and then schedule the proposed minimally invasive renal surgery.

## 2024-02-12 NOTE — PHYSICAL EXAM
[General Appearance - Well Developed] : well developed [General Appearance - Well Nourished] : well nourished [Normal Appearance] : normal appearance [Well Groomed] : well groomed [General Appearance - In No Acute Distress] : no acute distress [Abdomen Soft] : soft [Abdomen Tenderness] : non-tender [Abdomen Mass (___ Cm)] : no abdominal mass palpated [Costovertebral Angle Tenderness] : no ~M costovertebral angle tenderness [Urethral Meatus] : meatus normal [Penis Abnormality] : normal circumcised penis [Urinary Bladder Findings] : the bladder was normal on palpation [Epididymis] : the epididymides were normal [Testes Tenderness] : no tenderness of the testes [Testes Mass (___cm)] : there were no testicular masses [Skin Color & Pigmentation] : normal skin color and pigmentation [Heart Rate And Rhythm] : Heart rate and rhythm were normal [Edema] : no peripheral edema [] : no respiratory distress [Respiration, Rhythm And Depth] : normal respiratory rhythm and effort [Exaggerated Use Of Accessory Muscles For Inspiration] : no accessory muscle use [Oriented To Time, Place, And Person] : oriented to person, place, and time [Affect] : the affect was normal [Mood] : the mood was normal [Not Anxious] : not anxious [Normal Station and Gait] : the gait and station were normal for the patient's age [No Focal Deficits] : no focal deficits [No Palpable Adenopathy] : no palpable adenopathy [FreeTextEntry1] : Mild focal glans erythema. [Inguinal Lymph Nodes Enlarged Bilaterally] : inguinal

## 2024-02-27 ENCOUNTER — APPOINTMENT (OUTPATIENT)
Dept: UROLOGY | Facility: CLINIC | Age: 74
End: 2024-02-27
Payer: MEDICARE

## 2024-02-27 VITALS
HEIGHT: 66 IN | BODY MASS INDEX: 27.64 KG/M2 | TEMPERATURE: 97.5 F | SYSTOLIC BLOOD PRESSURE: 150 MMHG | DIASTOLIC BLOOD PRESSURE: 67 MMHG | WEIGHT: 172 LBS | HEART RATE: 68 BPM

## 2024-02-27 DIAGNOSIS — N13.30 UNSPECIFIED HYDRONEPHROSIS: ICD-10-CM

## 2024-02-27 PROCEDURE — 99215 OFFICE O/P EST HI 40 MIN: CPT

## 2024-02-27 NOTE — PHYSICAL EXAM
[General Appearance - Well Developed] : well developed [General Appearance - Well Nourished] : well nourished [Heart Rate And Rhythm] : heart rate and rhythm were normal [] : no respiratory distress [Abdomen Soft] : soft [Skin Color & Pigmentation] : normal skin color and pigmentation [Normal Station and Gait] : the gait and station were normal for the patient's age [Oriented To Time, Place, And Person] : oriented to person, place, and time [No Focal Deficits] : no focal deficits [Not Anxious] : not anxious

## 2024-02-27 NOTE — ASSESSMENT
[FreeTextEntry1] : 73 yo male with 2.6 cm left renal mass suspicious for RCC.  The patient's records were reviewed. An extensive/high level discussion on renal masses was held with the patient.  Renal masses were reviewed and the potential for benign lesions and malignant tumors was discussed. The role of additional radiologic imaging studies and for renal biopsy was discussed. Management options were discussed, including but not limited to observation, serial imaging studies, ablative therapies, partial nephrectomy, and radical nephrectomy. Open and minimally invasive (laparoscopic and robot-assisted) approaches were reviewed. The role of radiation and chemotherapy treatments was presented.  The merits and limitations of each of these options were discussed.   The patient is interested in proceeding with partial nephrectomy via a minimally invasive approach.  Risks of robot-assisted laparoscopic partial nephrectomy were discussed. The patient was thoroughly counseled about risks of the procedure including, but not limited to, bleeding, hemorrhage, infection, abscess formation, sepsis, injury to surrounding structures, organ injury (including but not limited to the intestines, stomach, spleen, liver, gallbladder, kidney, adrenal, and pancreas), pleural injury, vascular injury to the great vessels or accessory arteries or veins, bowel obstruction, bowel herniation, thermal or serosal injury to the bowel resulting in immediate or delayed bowel leak or perforation, delayed bleeding or hemorrhage, pseudoaneurysm or arteriovenous malformation development, ureteral injury or stricture, urine leaks, urinary fistula, wound infection, wound separation or dehiscence, incisional hernias, adhesions, chronic pain, delayed complications, renal insufficiency, renal failure, ileus, deep venous thrombosis, pulmonary embolus, pneumonia, neuromuscular complications, paresthesias, cerebrovascular accident, myocardial infarction, cardiac, respiratory or anesthetic complications, and mortality. The possibility of requiring a blood transfusion and its associated risks was discussed. The possibility of open conversion, re-operation, and potential need for additional intervention, (as well as machine breakdown in the setting of a robotic procedure) was thoroughly discussed.  The possibility of positive surgical margins, a benign pathologic diagnosis, or no tumor in the specimen was discussed. The potential for radical nephrectomy in the setting of attempted partial nephrectomy was discussed. The possibility of local recurrence of tumor, advanced, and metastatic disease was presented.    The patient asked questions and they were answered. The patient demonstrated understanding of the associated risks and benefits of the procedure and wishes to proceed with surgery.  He would like to schedule surgery for sometime in May when it better suits his schedule.   We discussed the left hydronephrosis noted on MRI.  I would like to get a CTU to assess whether there is a functional obstruction.  He agrees with this plan.  Plan: 1. CTU 2. F/u after # 1 3. Will schedule RALPN for May

## 2024-02-27 NOTE — HISTORY OF PRESENT ILLNESS
[FreeTextEntry1] : [Dr. Arriaga previous notes] Mr. LIDA SEAY comes in today for followup. He reports moderate stable lower urinary tract symptoms (obstructive and irritative) with nocturia x 2. IPSS: 14/2 Sono (performed to assess bladder emptying):  244cc PVR (PVRs; 178cc Oct 2023; 157cc, July 2023)   A CT scan ordered in December 2021 to evaluate gross hematuria identified a 1.3 solid left upper pole enhancing renal lesion (see below). It has progressively enlarged and is currently 2.6 cm.  He denies any hematuria or systemic complaints. An attempt at cryoablation by IR was terminated due to extensive intervening bowel.  He denies any systemic complaints.  His erections are reportedly normal.  CT: 7/21/22--Enlarging lesion (1.5 x 1.5 x 1.0cm) in the anterior upper pole of the left kidney concerning for a renal neoplasm. Enhancing lesion at the dome of the right lobe of the liver; 12/23/21--1.1 x 0.6 x 1.3cm cystic and solid left upper pole enhancing renal lesion without internal fat or calcification may represent a small renal cell carcinoma or oncocytoma;   Renal US: 1/27/23--Bilateral renal cysts.  No hydronephrosis.  No solid renal mass; 7/21/22--Solid hypoechoic 1.7cm lesion in the anterior mid to lower pole left kidney, slightly increased in size since the prior CT scan.  Suspicious for renal neoplasm;  MRI Abdomen: 1/10/24-Mixed solid and cystic enhancing mass measuring 2.6 x 1.8 cm in the left kidney (increased from 2.1 cm); -6/30/23--2.1 cm enhancing left renal mass (previously 1.5 cm), suspicious for renal cell carcinoma, no local lymphadenopathy or venous invasion, bilateral renal cysts, hepatic hemangiomas and cysts  Pelvic US:  3/30/21--76cc prostate. 115cc PVR. Bladder diverticulum  Creat: 9/13/23--0.59; 3/8/23--0.76; 11/8/21--0.62; 4/16/21--0.75mg/dl  PSA:  9/13/23--1.05; 3/8/23--1.46; 12/5/22--1.73; 11/8/21--1.23; 4/13/21--1.34; 2/4/21--1.4; 6/3/20--1.64; 7/15/19--1.37; 4/12/18--1.39; 1/24/17--0.96; 4/13/16--1.69; 5/19/15--1.35  ************* 2/27/24: 75 yo male patient referred by Dr. Arriaga for management of 2.6 cm left upper pole renal mass suspicious for RCC.  This lesion has been monitored for several years.  It has grown slowly over time.  An ablation was attempted by IR in October 2023 but was aborted due to proximity of bowel to the ablation bed.  His most recent imaging was an MRI from 1/10/24.  This shown the lesion slightly larger than previous imaging.  It also noted mild left hydronephrosis of unclear etiology.  He denies any flank pain.

## 2024-03-20 ENCOUNTER — APPOINTMENT (OUTPATIENT)
Dept: HEART AND VASCULAR | Facility: CLINIC | Age: 74
End: 2024-03-20
Payer: MEDICARE

## 2024-03-20 ENCOUNTER — NON-APPOINTMENT (OUTPATIENT)
Age: 74
End: 2024-03-20

## 2024-03-20 VITALS
BODY MASS INDEX: 28.45 KG/M2 | SYSTOLIC BLOOD PRESSURE: 130 MMHG | HEART RATE: 61 BPM | WEIGHT: 177 LBS | OXYGEN SATURATION: 96 % | HEIGHT: 66 IN | TEMPERATURE: 98.4 F | DIASTOLIC BLOOD PRESSURE: 76 MMHG

## 2024-03-20 DIAGNOSIS — N28.89 OTHER SPECIFIED DISORDERS OF KIDNEY AND URETER: ICD-10-CM

## 2024-03-20 PROCEDURE — G2211 COMPLEX E/M VISIT ADD ON: CPT

## 2024-03-20 PROCEDURE — 93000 ELECTROCARDIOGRAM COMPLETE: CPT

## 2024-03-20 PROCEDURE — 99214 OFFICE O/P EST MOD 30 MIN: CPT

## 2024-03-20 NOTE — HISTORY OF PRESENT ILLNESS
[FreeTextEntry1] : 74 year male who comes for followup of his CAD. He is anticipating needing kidney surgery with partial nephrectomy. He denies having any chest pain, SOB, [place], dizziness, palpitations, orthopnea, PND or syncope. He is walking 2 hours without symptoms

## 2024-03-20 NOTE — ASSESSMENT
[FreeTextEntry1] : An EKG was performed to evaluate for arrhythmia and ischemia.  CAD-- to continue beta blockers and statin  I informed the patient that pending results of his  Echocardiogram performed with Dr Cameron  I did not find a Cardiovascular contraindication to partial nephrectomy surgery at this time   I encouraged continued risk factor reduction and gradual increase in aerobic activity as tolerated  34   minutes were spent discussing cardiac risk excluding procedure time

## 2024-04-09 ENCOUNTER — APPOINTMENT (OUTPATIENT)
Dept: UROLOGY | Facility: CLINIC | Age: 74
End: 2024-04-09

## 2024-04-09 VITALS
SYSTOLIC BLOOD PRESSURE: 138 MMHG | HEART RATE: 70 BPM | HEIGHT: 60 IN | BODY MASS INDEX: 34.75 KG/M2 | DIASTOLIC BLOOD PRESSURE: 78 MMHG | WEIGHT: 177 LBS | TEMPERATURE: 96 F

## 2024-04-09 PROCEDURE — 99215 OFFICE O/P EST HI 40 MIN: CPT

## 2024-04-09 NOTE — HISTORY OF PRESENT ILLNESS
[FreeTextEntry1] : [Dr. Arriaga previous notes] Mr. LIDA SEAY comes in today for followup. He reports moderate stable lower urinary tract symptoms (obstructive and irritative) with nocturia x 2. IPSS: 14/2 Sono (performed to assess bladder emptying):  244cc PVR (PVRs; 178cc Oct 2023; 157cc, July 2023)   A CT scan ordered in December 2021 to evaluate gross hematuria identified a 1.3 solid left upper pole enhancing renal lesion (see below). It has progressively enlarged and is currently 2.6 cm.  He denies any hematuria or systemic complaints. An attempt at cryoablation by IR was terminated due to extensive intervening bowel.  He denies any systemic complaints.  His erections are reportedly normal.  CT: 7/21/22--Enlarging lesion (1.5 x 1.5 x 1.0cm) in the anterior upper pole of the left kidney concerning for a renal neoplasm. Enhancing lesion at the dome of the right lobe of the liver; 12/23/21--1.1 x 0.6 x 1.3cm cystic and solid left upper pole enhancing renal lesion without internal fat or calcification may represent a small renal cell carcinoma or oncocytoma;   Renal US: 1/27/23--Bilateral renal cysts.  No hydronephrosis.  No solid renal mass; 7/21/22--Solid hypoechoic 1.7cm lesion in the anterior mid to lower pole left kidney, slightly increased in size since the prior CT scan.  Suspicious for renal neoplasm;  MRI Abdomen: 1/10/24-Mixed solid and cystic enhancing mass measuring 2.6 x 1.8 cm in the left kidney (increased from 2.1 cm); -6/30/23--2.1 cm enhancing left renal mass (previously 1.5 cm), suspicious for renal cell carcinoma, no local lymphadenopathy or venous invasion, bilateral renal cysts, hepatic hemangiomas and cysts  Pelvic US:  3/30/21--76cc prostate. 115cc PVR. Bladder diverticulum  Creat: 9/13/23--0.59; 3/8/23--0.76; 11/8/21--0.62; 4/16/21--0.75mg/dl  PSA:  9/13/23--1.05; 3/8/23--1.46; 12/5/22--1.73; 11/8/21--1.23; 4/13/21--1.34; 2/4/21--1.4; 6/3/20--1.64; 7/15/19--1.37; 4/12/18--1.39; 1/24/17--0.96; 4/13/16--1.69; 5/19/15--1.35  ************* 2/27/24: 75 yo male patient referred by Dr. Arriaga for management of 2.6 cm left upper pole renal mass suspicious for RCC.  This lesion has been monitored for several years.  It has grown slowly over time.  An ablation was attempted by IR in October 2023 but was aborted due to proximity of bowel to the ablation bed.  His most recent imaging was an MRI from 1/10/24.  This shown the lesion slightly larger than previous imaging.  It also noted mild left hydronephrosis of unclear etiology.  He denies any flank pain.   **************** 4/9/24: CT shows a 2.6 cm cystic and solid left renal lesion suspicious for RCC.  This lesion was not amenable to ablation due to its proximity to bowel.

## 2024-04-14 LAB — BACTERIA UR CULT: NORMAL

## 2024-05-08 VITALS
TEMPERATURE: 98 F | RESPIRATION RATE: 16 BRPM | HEIGHT: 65 IN | DIASTOLIC BLOOD PRESSURE: 81 MMHG | WEIGHT: 183.2 LBS | SYSTOLIC BLOOD PRESSURE: 143 MMHG | OXYGEN SATURATION: 95 % | HEART RATE: 67 BPM

## 2024-05-08 NOTE — PATIENT PROFILE ADULT - FUNCTIONAL ASSESSMENT - DAILY ACTIVITY 5.
Subjective:       Patient ID: Raul Gandara is a 61 y.o. male.    Chief Complaint: Hypertension    Mr. Gandara presents to visit for HTN. Multiple elevated readings over past few weeks. On xarelto for PE. Hx of afib, no anti-arrhythmic medication.     Hypertension  This is a new problem. The current episode started more than 1 month ago. The problem is uncontrolled. Pertinent negatives include no anxiety, blurred vision, chest pain, headaches, malaise/fatigue, orthopnea, palpitations, peripheral edema or shortness of breath. There are no associated agents to hypertension. Risk factors for coronary artery disease include male gender and smoking/tobacco exposure. Past treatments include nothing. There are no compliance problems.  There is no history of angina, kidney disease, CAD/MI, CVA, heart failure, left ventricular hypertrophy, PVD or retinopathy.     Patient Active Problem List   Diagnosis    Alcohol use    Obesity, Class I, BMI 30-34.9    Chewing tobacco nicotine dependence without complication    Tubular adenoma of colon    Allergic rhinitis    Dyspnea on exertion    Acute saddle pulmonary embolism without acute cor pulmonale    Paroxysmal A-fib    Left femoral vein DVT    History of basal cell carcinoma    Hypertension       Family History   Problem Relation Age of Onset    Liver cancer Mother     Leukemia Father      Past Surgical History:   Procedure Laterality Date    FLUOROSCOPY Bilateral 6/7/2021    Procedure: FLUOROSCOPY- Pulmonary EKOS;  Surgeon: Sebastian Mills Jr., MD;  Location: [facility] CATH LAB;  Service: General;  Laterality: Bilateral;    HERNIA REPAIR           Current Outpatient Medications:     atorvastatin (LIPITOR) 20 MG tablet, TAKE 1 TABLET BY MOUTH EVERY DAY, Disp: 90 tablet, Rfl: 3    multivitamin with minerals tablet, Take 1 tablet by mouth once daily., Disp: , Rfl:     rivaroxaban (XARELTO) 10 mg Tab, Take 1 tablet (10 mg total) by mouth daily with dinner or evening meal., Disp: 90  tablet, Rfl: 3    vitamin D (VITAMIN D3) 1000 units Tab, Take 1,000 Units by mouth once daily., Disp: , Rfl:     zinc gluconate 50 mg tablet, Take 50 mg by mouth once daily., Disp: , Rfl:     metoprolol succinate (TOPROL-XL) 25 MG 24 hr tablet, Take 1 tablet (25 mg total) by mouth once daily., Disp: 30 tablet, Rfl: 0    Review of Systems   Constitutional:  Negative for malaise/fatigue.   Eyes:  Negative for blurred vision.   Respiratory:  Negative for shortness of breath.    Cardiovascular:  Negative for chest pain, palpitations and orthopnea.   Neurological:  Negative for headaches.     Objective:   BP (!) 140/76 (BP Location: Left arm, Patient Position: Sitting, BP Method: Medium (Manual))   Pulse 86   Temp 98.1 °F (36.7 °C) (Temporal)   Ht 6' (1.829 m)   Wt 107.5 kg (236 lb 15.9 oz)   SpO2 96%   BMI 32.14 kg/m²      Physical Exam  Vitals reviewed.   Constitutional:       General: He is not in acute distress.     Appearance: Normal appearance. He is well-developed. He is not ill-appearing or diaphoretic.   Cardiovascular:      Rate and Rhythm: Normal rate and regular rhythm.      Pulses: Normal pulses.      Heart sounds: Normal heart sounds. No murmur heard.    No friction rub. No gallop.   Pulmonary:      Effort: Pulmonary effort is normal. No respiratory distress.      Breath sounds: Normal breath sounds. No rales.   Skin:     General: Skin is warm and dry.      Coloration: Skin is not pale.      Findings: No erythema.   Neurological:      Mental Status: He is alert and oriented to person, place, and time.       Assessment & Plan     Problem List Items Addressed This Visit          Cardiac/Vascular    Paroxysmal A-fib    Current Assessment & Plan     On xarelto. Has never been on BB per pt. When he was originally diagnosed, medication was too expensive per pt. Starting on metoprolol for HTN/afib dual purpose.          Hypertension - Primary    Current Assessment & Plan     Blood pressure continues to be  "elevated. Starting on metoprolol. Follow up in two weeks for BP check.          Relevant Medications    metoprolol succinate (TOPROL-XL) 25 MG 24 hr tablet       Hematology    Acute saddle pulmonary embolism without acute cor pulmonale    Current Assessment & Plan     On xarelto.             Other    Chewing tobacco nicotine dependence without complication    Current Assessment & Plan     Counseled on the importance of tobacco cessation in order to improve overall quality of life and reduce risk of future comorbidities. Avoid dipping prior to appt for blood pressure check.             Follow up in about 2 weeks (around 12/22/2022) for hypertension nurse visit. .          Portions of this note may have been created with voice recognition software. Occasional "wrong-word" or "sound-a-like" substitutions may have occurred due to the inherent limitations of voice recognition software. Please, read the note carefully and recognize, using context, where substitutions have occurred.         " 4 = No assist / stand by assistance

## 2024-05-08 NOTE — PATIENT PROFILE ADULT - IS THERE A SUSPICION OF ABUSE/NEGLIGENCE?
Has The Patient Been Vaccinated For Covid-19?: Yes
Has The Patient Been Infected With Covid-19 In The Past?: No
Previous Infection Text: The patient has recovered from a previous COVID-19 infection.
Detail Level: Simple
no

## 2024-05-08 NOTE — PATIENT PROFILE ADULT - STATED REASON FOR ADMISSION
left partial nephrectomy  left partial nephrectomy //robotic assited laparoscopiuc left partial nephrectomy , possible radical possible open

## 2024-05-23 ENCOUNTER — APPOINTMENT (OUTPATIENT)
Dept: HEART AND VASCULAR | Facility: CLINIC | Age: 74
End: 2024-05-23

## 2024-06-17 ENCOUNTER — APPOINTMENT (OUTPATIENT)
Dept: HEART AND VASCULAR | Facility: CLINIC | Age: 74
End: 2024-06-17
Payer: MEDICARE

## 2024-06-17 ENCOUNTER — NON-APPOINTMENT (OUTPATIENT)
Age: 74
End: 2024-06-17

## 2024-06-17 VITALS
DIASTOLIC BLOOD PRESSURE: 52 MMHG | SYSTOLIC BLOOD PRESSURE: 112 MMHG | WEIGHT: 182 LBS | OXYGEN SATURATION: 96 % | TEMPERATURE: 98 F | HEART RATE: 70 BPM | HEIGHT: 64 IN | BODY MASS INDEX: 31.07 KG/M2

## 2024-06-17 VITALS — SYSTOLIC BLOOD PRESSURE: 120 MMHG | DIASTOLIC BLOOD PRESSURE: 60 MMHG

## 2024-06-17 DIAGNOSIS — I10 ESSENTIAL (PRIMARY) HYPERTENSION: ICD-10-CM

## 2024-06-17 DIAGNOSIS — I25.10 ATHEROSCLEROTIC HEART DISEASE OF NATIVE CORONARY ARTERY W/OUT ANGINA PECTORIS: ICD-10-CM

## 2024-06-17 PROCEDURE — G2211 COMPLEX E/M VISIT ADD ON: CPT

## 2024-06-17 PROCEDURE — 99214 OFFICE O/P EST MOD 30 MIN: CPT

## 2024-06-17 PROCEDURE — 93000 ELECTROCARDIOGRAM COMPLETE: CPT

## 2024-06-17 RX ORDER — LANOLIN ALCOHOL/MO/W.PET/CERES
500 CREAM (GRAM) TOPICAL
Refills: 0 | Status: ACTIVE | COMMUNITY

## 2024-06-17 NOTE — HISTORY OF PRESENT ILLNESS
[FreeTextEntry1] : 74 year male who comes for Cardiology evaluation prior to partial nephrectomy for a renal mass on July 11. He denies having any chest pain, SOB, GREENFIELD, dizziness, palpitations, orthopnea, PND or syncope. He is walking a mile and climbs subway stairs without symptom. At the time of his visit we reviewed that his most recent Echo and Nuclear Stress Tests were OK

## 2024-06-17 NOTE — ASSESSMENT
[FreeTextEntry1] : An EKG was performed to evaluate for arrhythmia and ischemia. NSR with no change from baseline  .CAD-- I informed the patient that  I did not find a Cardiovascular contraindication to the proposed surgery at this time   I encouraged continued risk factor reduction and gradual increase in aerobic activity as tolerated  34   minutes were spent discussing cardiac risk excluding procedure time

## 2024-06-18 PROBLEM — I10 HYPERTENSION: Status: ACTIVE | Noted: 2023-04-12

## 2024-07-01 ENCOUNTER — OUTPATIENT (OUTPATIENT)
Dept: OUTPATIENT SERVICES | Facility: HOSPITAL | Age: 74
LOS: 1 days | End: 2024-07-01
Payer: MEDICARE

## 2024-07-01 PROCEDURE — 71046 X-RAY EXAM CHEST 2 VIEWS: CPT | Mod: 26

## 2024-07-01 PROCEDURE — 71046 X-RAY EXAM CHEST 2 VIEWS: CPT

## 2024-07-10 ENCOUNTER — TRANSCRIPTION ENCOUNTER (OUTPATIENT)
Age: 74
End: 2024-07-10

## 2024-07-11 ENCOUNTER — APPOINTMENT (OUTPATIENT)
Dept: UROLOGY | Facility: HOSPITAL | Age: 74
End: 2024-07-11

## 2024-07-11 ENCOUNTER — RESULT REVIEW (OUTPATIENT)
Age: 74
End: 2024-07-11

## 2024-07-11 ENCOUNTER — INPATIENT (INPATIENT)
Facility: HOSPITAL | Age: 74
LOS: 0 days | Discharge: HOME | DRG: 657 | End: 2024-07-12
Attending: UROLOGY | Admitting: UROLOGY
Payer: MEDICARE

## 2024-07-11 DIAGNOSIS — Z98.890 OTHER SPECIFIED POSTPROCEDURAL STATES: Chronic | ICD-10-CM

## 2024-07-11 DIAGNOSIS — Z98.49 CATARACT EXTRACTION STATUS, UNSPECIFIED EYE: Chronic | ICD-10-CM

## 2024-07-11 DIAGNOSIS — N28.89 OTHER SPECIFIED DISORDERS OF KIDNEY AND URETER: Chronic | ICD-10-CM

## 2024-07-11 DIAGNOSIS — Z90.89 ACQUIRED ABSENCE OF OTHER ORGANS: Chronic | ICD-10-CM

## 2024-07-11 DIAGNOSIS — N28.89 OTHER SPECIFIED DISORDERS OF KIDNEY AND URETER: ICD-10-CM

## 2024-07-11 LAB
BLD GP AB SCN SERPL QL: NEGATIVE — SIGNIFICANT CHANGE UP
RH IG SCN BLD-IMP: NEGATIVE — SIGNIFICANT CHANGE UP

## 2024-07-11 PROCEDURE — 88307 TISSUE EXAM BY PATHOLOGIST: CPT | Mod: 26

## 2024-07-11 PROCEDURE — 76998 US GUIDE INTRAOP: CPT | Mod: 26,LT

## 2024-07-11 PROCEDURE — 76770 US EXAM ABDO BACK WALL COMP: CPT | Mod: 26,AS,LT

## 2024-07-11 PROCEDURE — 50543 LAPARO PARTIAL NEPHRECTOMY: CPT | Mod: LT

## 2024-07-11 PROCEDURE — 50543 LAPARO PARTIAL NEPHRECTOMY: CPT | Mod: AS,LT

## 2024-07-11 PROCEDURE — 76998 US GUIDE INTRAOP: CPT | Mod: 26,AS,LT

## 2024-07-11 DEVICE — SURGIFLO HEMOSTATIC MATRIX KIT: Type: IMPLANTABLE DEVICE | Status: FUNCTIONAL

## 2024-07-11 DEVICE — LIGATING CLIPS WECK HEMOLOK POLYMER LARGE (PURPLE) 6: Type: IMPLANTABLE DEVICE | Status: FUNCTIONAL

## 2024-07-11 DEVICE — SURGICEL FIBRILLAR 4 X 4": Type: IMPLANTABLE DEVICE | Status: FUNCTIONAL

## 2024-07-11 RX ORDER — RAMIPRIL 10 MG/1
0 CAPSULE ORAL
Refills: 0 | DISCHARGE

## 2024-07-11 RX ORDER — DEXTROSE MONOHYDRATE AND SODIUM CHLORIDE 5; .3 G/100ML; G/100ML
1000 INJECTION, SOLUTION INTRAVENOUS
Refills: 0 | Status: DISCONTINUED | OUTPATIENT
Start: 2024-07-11 | End: 2024-07-11

## 2024-07-11 RX ORDER — SODIUM CHLORIDE 0.9 % (FLUSH) 0.9 %
1000 SYRINGE (ML) INJECTION
Refills: 0 | Status: DISCONTINUED | OUTPATIENT
Start: 2024-07-11 | End: 2024-07-11

## 2024-07-11 RX ORDER — ENOXAPARIN SODIUM 100 MG/ML
40 INJECTION SUBCUTANEOUS ONCE
Refills: 0 | Status: DISCONTINUED | OUTPATIENT
Start: 2024-07-11 | End: 2024-07-11

## 2024-07-11 RX ORDER — OXYCODONE HYDROCHLORIDE 100 MG/5ML
5 SOLUTION ORAL EVERY 6 HOURS
Refills: 0 | Status: DISCONTINUED | OUTPATIENT
Start: 2024-07-11 | End: 2024-07-12

## 2024-07-11 RX ORDER — HYDROMORPHONE HCL 0.2 MG/ML
0.5 INJECTION, SOLUTION INTRAVENOUS EVERY 4 HOURS
Refills: 0 | Status: DISCONTINUED | OUTPATIENT
Start: 2024-07-11 | End: 2024-07-12

## 2024-07-11 RX ORDER — HEPARIN SODIUM 50 [USP'U]/ML
5000 INJECTION, SOLUTION INTRAVENOUS ONCE
Refills: 0 | Status: COMPLETED | OUTPATIENT
Start: 2024-07-11 | End: 2024-07-11

## 2024-07-11 RX ORDER — ONDANSETRON HYDROCHLORIDE 2 MG/ML
4 INJECTION INTRAMUSCULAR; INTRAVENOUS EVERY 6 HOURS
Refills: 0 | Status: DISCONTINUED | OUTPATIENT
Start: 2024-07-11 | End: 2024-07-12

## 2024-07-11 RX ORDER — ACETAMINOPHEN 325 MG
1000 TABLET ORAL ONCE
Refills: 0 | Status: COMPLETED | OUTPATIENT
Start: 2024-07-11 | End: 2024-07-11

## 2024-07-11 RX ORDER — ASPIRIN 325 MG/1
0 TABLET, FILM COATED ORAL
Refills: 0 | DISCHARGE

## 2024-07-11 RX ORDER — LEVOTHYROXINE SODIUM 25 MCG
112 TABLET ORAL DAILY
Refills: 0 | Status: DISCONTINUED | OUTPATIENT
Start: 2024-07-12 | End: 2024-07-12

## 2024-07-11 RX ORDER — ALFUZOSIN HYDROCHLORIDE 10 MG/1
1 TABLET, EXTENDED RELEASE ORAL
Refills: 0 | DISCHARGE

## 2024-07-11 RX ORDER — ATORVASTATIN CALCIUM 20 MG/1
80 TABLET, FILM COATED ORAL AT BEDTIME
Refills: 0 | Status: DISCONTINUED | OUTPATIENT
Start: 2024-07-11 | End: 2024-07-12

## 2024-07-11 RX ORDER — HEPARIN SODIUM 50 [USP'U]/ML
5000 INJECTION, SOLUTION INTRAVENOUS EVERY 8 HOURS
Refills: 0 | Status: DISCONTINUED | OUTPATIENT
Start: 2024-07-11 | End: 2024-07-12

## 2024-07-11 RX ORDER — ROSUVASTATIN CALCIUM 20 MG/1
1 TABLET ORAL
Refills: 0 | DISCHARGE

## 2024-07-11 RX ORDER — SODIUM CHLORIDE 0.9 % (FLUSH) 0.9 %
1000 SYRINGE (ML) INJECTION
Refills: 0 | Status: DISCONTINUED | OUTPATIENT
Start: 2024-07-11 | End: 2024-07-12

## 2024-07-11 RX ORDER — ACETAMINOPHEN 325 MG
975 TABLET ORAL EVERY 6 HOURS
Refills: 0 | Status: DISCONTINUED | OUTPATIENT
Start: 2024-07-11 | End: 2024-07-12

## 2024-07-11 RX ORDER — ACETAMINOPHEN 325 MG
1000 TABLET ORAL ONCE
Refills: 0 | Status: DISCONTINUED | OUTPATIENT
Start: 2024-07-11 | End: 2024-07-11

## 2024-07-11 RX ORDER — CEFAZOLIN 10 G/1
2000 INJECTION, POWDER, FOR SOLUTION INTRAVENOUS EVERY 8 HOURS
Refills: 0 | Status: COMPLETED | OUTPATIENT
Start: 2024-07-11 | End: 2024-07-11

## 2024-07-11 RX ORDER — METOPROLOL TARTRATE 50 MG
1 TABLET ORAL
Refills: 0 | DISCHARGE

## 2024-07-11 RX ORDER — LIDOCAINE HCL 28 MG/G
1 GEL TOPICAL EVERY 24 HOURS
Refills: 0 | Status: DISCONTINUED | OUTPATIENT
Start: 2024-07-11 | End: 2024-07-12

## 2024-07-11 RX ORDER — POLYVINYL ALCOHOL, POVIDONE .5; .6 G/100ML; G/100ML
1 SOLUTION OPHTHALMIC EVERY 6 HOURS
Refills: 0 | Status: DISCONTINUED | OUTPATIENT
Start: 2024-07-11 | End: 2024-07-12

## 2024-07-11 RX ORDER — LEVOTHYROXINE SODIUM 25 MCG
1 TABLET ORAL
Refills: 0 | DISCHARGE

## 2024-07-11 RX ADMIN — Medication 1000 MILLIGRAM(S): at 07:28

## 2024-07-11 RX ADMIN — Medication 1000 MILLIGRAM(S): at 14:35

## 2024-07-11 RX ADMIN — CEFAZOLIN 100 MILLIGRAM(S): 10 INJECTION, POWDER, FOR SOLUTION INTRAVENOUS at 16:49

## 2024-07-11 RX ADMIN — Medication 110 MILLILITER(S): at 14:18

## 2024-07-11 RX ADMIN — CEFAZOLIN 100 MILLIGRAM(S): 10 INJECTION, POWDER, FOR SOLUTION INTRAVENOUS at 23:52

## 2024-07-11 RX ADMIN — ATORVASTATIN CALCIUM 80 MILLIGRAM(S): 20 TABLET, FILM COATED ORAL at 21:55

## 2024-07-11 RX ADMIN — HEPARIN SODIUM 5000 UNIT(S): 50 INJECTION, SOLUTION INTRAVENOUS at 07:28

## 2024-07-11 RX ADMIN — Medication 975 MILLIGRAM(S): at 21:55

## 2024-07-11 RX ADMIN — Medication 400 MILLIGRAM(S): at 14:18

## 2024-07-11 RX ADMIN — HEPARIN SODIUM 5000 UNIT(S): 50 INJECTION, SOLUTION INTRAVENOUS at 21:55

## 2024-07-12 ENCOUNTER — TRANSCRIPTION ENCOUNTER (OUTPATIENT)
Age: 74
End: 2024-07-12

## 2024-07-12 VITALS
TEMPERATURE: 98 F | DIASTOLIC BLOOD PRESSURE: 73 MMHG | SYSTOLIC BLOOD PRESSURE: 152 MMHG | HEART RATE: 92 BPM | OXYGEN SATURATION: 93 % | RESPIRATION RATE: 17 BRPM

## 2024-07-12 LAB
ANION GAP SERPL CALC-SCNC: 8 MMOL/L — SIGNIFICANT CHANGE UP (ref 5–17)
BASOPHILS # BLD AUTO: 0.01 K/UL — SIGNIFICANT CHANGE UP (ref 0–0.2)
BASOPHILS NFR BLD AUTO: 0.1 % — SIGNIFICANT CHANGE UP (ref 0–2)
BUN SERPL-MCNC: 12 MG/DL — SIGNIFICANT CHANGE UP (ref 7–23)
CALCIUM SERPL-MCNC: 8.7 MG/DL — SIGNIFICANT CHANGE UP (ref 8.4–10.5)
CHLORIDE SERPL-SCNC: 106 MMOL/L — SIGNIFICANT CHANGE UP (ref 96–108)
CO2 SERPL-SCNC: 25 MMOL/L — SIGNIFICANT CHANGE UP (ref 22–31)
CREAT FLD-MCNC: 0.71 MG/DL — SIGNIFICANT CHANGE UP
CREAT SERPL-MCNC: 0.77 MG/DL — SIGNIFICANT CHANGE UP (ref 0.5–1.3)
CULTURE RESULTS: NO GROWTH — SIGNIFICANT CHANGE UP
EGFR: 93 ML/MIN/1.73M2 — SIGNIFICANT CHANGE UP
EOSINOPHIL # BLD AUTO: 0 K/UL — SIGNIFICANT CHANGE UP (ref 0–0.5)
EOSINOPHIL NFR BLD AUTO: 0 % — SIGNIFICANT CHANGE UP (ref 0–6)
GLUCOSE SERPL-MCNC: 112 MG/DL — HIGH (ref 70–99)
HCT VFR BLD CALC: 40.7 % — SIGNIFICANT CHANGE UP (ref 39–50)
HGB BLD-MCNC: 13.2 G/DL — SIGNIFICANT CHANGE UP (ref 13–17)
IMM GRANULOCYTES NFR BLD AUTO: 0.5 % — SIGNIFICANT CHANGE UP (ref 0–0.9)
LYMPHOCYTES # BLD AUTO: 1.11 K/UL — SIGNIFICANT CHANGE UP (ref 1–3.3)
LYMPHOCYTES # BLD AUTO: 10 % — LOW (ref 13–44)
MCHC RBC-ENTMCNC: 30.6 PG — SIGNIFICANT CHANGE UP (ref 27–34)
MCHC RBC-ENTMCNC: 32.4 GM/DL — SIGNIFICANT CHANGE UP (ref 32–36)
MCV RBC AUTO: 94.4 FL — SIGNIFICANT CHANGE UP (ref 80–100)
MONOCYTES # BLD AUTO: 1.16 K/UL — HIGH (ref 0–0.9)
MONOCYTES NFR BLD AUTO: 10.5 % — SIGNIFICANT CHANGE UP (ref 2–14)
NEUTROPHILS # BLD AUTO: 8.76 K/UL — HIGH (ref 1.8–7.4)
NEUTROPHILS NFR BLD AUTO: 78.9 % — HIGH (ref 43–77)
NRBC # BLD: 0 /100 WBCS — SIGNIFICANT CHANGE UP (ref 0–0)
PLATELET # BLD AUTO: 160 K/UL — SIGNIFICANT CHANGE UP (ref 150–400)
POTASSIUM SERPL-MCNC: 4.4 MMOL/L — SIGNIFICANT CHANGE UP (ref 3.5–5.3)
POTASSIUM SERPL-SCNC: 4.4 MMOL/L — SIGNIFICANT CHANGE UP (ref 3.5–5.3)
RBC # BLD: 4.31 M/UL — SIGNIFICANT CHANGE UP (ref 4.2–5.8)
RBC # FLD: 14.9 % — HIGH (ref 10.3–14.5)
SODIUM SERPL-SCNC: 139 MMOL/L — SIGNIFICANT CHANGE UP (ref 135–145)
SPECIMEN SOURCE: SIGNIFICANT CHANGE UP
WBC # BLD: 11.1 K/UL — HIGH (ref 3.8–10.5)
WBC # FLD AUTO: 11.1 K/UL — HIGH (ref 3.8–10.5)

## 2024-07-12 PROCEDURE — 86850 RBC ANTIBODY SCREEN: CPT

## 2024-07-12 PROCEDURE — S2900: CPT

## 2024-07-12 PROCEDURE — 86901 BLOOD TYPING SEROLOGIC RH(D): CPT

## 2024-07-12 PROCEDURE — 80048 BASIC METABOLIC PNL TOTAL CA: CPT

## 2024-07-12 PROCEDURE — C1889: CPT

## 2024-07-12 PROCEDURE — 36415 COLL VENOUS BLD VENIPUNCTURE: CPT

## 2024-07-12 PROCEDURE — 87086 URINE CULTURE/COLONY COUNT: CPT

## 2024-07-12 PROCEDURE — 86923 COMPATIBILITY TEST ELECTRIC: CPT

## 2024-07-12 PROCEDURE — 85025 COMPLETE CBC W/AUTO DIFF WBC: CPT

## 2024-07-12 PROCEDURE — 82570 ASSAY OF URINE CREATININE: CPT

## 2024-07-12 PROCEDURE — 86900 BLOOD TYPING SEROLOGIC ABO: CPT

## 2024-07-12 RX ADMIN — Medication 975 MILLIGRAM(S): at 17:43

## 2024-07-12 RX ADMIN — POLYVINYL ALCOHOL, POVIDONE 1 DROP(S): .5; .6 SOLUTION OPHTHALMIC at 09:13

## 2024-07-12 RX ADMIN — HEPARIN SODIUM 5000 UNIT(S): 50 INJECTION, SOLUTION INTRAVENOUS at 13:28

## 2024-07-12 RX ADMIN — Medication 975 MILLIGRAM(S): at 04:06

## 2024-07-12 RX ADMIN — Medication 975 MILLIGRAM(S): at 09:15

## 2024-07-12 RX ADMIN — POLYVINYL ALCOHOL, POVIDONE 1 DROP(S): .5; .6 SOLUTION OPHTHALMIC at 17:03

## 2024-07-12 RX ADMIN — HEPARIN SODIUM 5000 UNIT(S): 50 INJECTION, SOLUTION INTRAVENOUS at 05:42

## 2024-07-12 RX ADMIN — POLYVINYL ALCOHOL, POVIDONE 1 DROP(S): .5; .6 SOLUTION OPHTHALMIC at 04:06

## 2024-07-12 RX ADMIN — Medication 112 MICROGRAM(S): at 05:42

## 2024-07-12 RX ADMIN — Medication 975 MILLIGRAM(S): at 08:29

## 2024-07-12 NOTE — DISCHARGE NOTE PROVIDER - HOSPITAL COURSE
Patient is a 74 year-old male with PMH of hypothyroidism, HLD, HTN and left renal mass who underwent robotic left partial nephrectomy on 7/11/2024. Surgical course with complication. A lópez catheter and DAYNA drain were placed at the end of the case. The patient was transferred in stable condition to the PACU and stayed overnight on the regional floor. Patient remained afebrile and hemodynamically overnight. By POD1, patient was OOB and ambulating without problem. Morning CBC and BMP were within normal limits. The fluid creatinine from the DAYNA drain was seroequivalent. DAYNA drain was removed on POD1. López catheter was removed on POD1 and patient successfully passed TOV. Patient was deemed ready for discharge home. Postoperative course was without complication.

## 2024-07-12 NOTE — DISCHARGE NOTE NURSING/CASE MANAGEMENT/SOCIAL WORK - PATIENT PORTAL LINK FT
You can access the FollowMyHealth Patient Portal offered by [facility] by registering at the following website: [email or web address]. By joining [facility]’s FollowMyHealth portal, you will also be able to view your health information using other applications (apps) compatible with our system.

## 2024-07-12 NOTE — PROGRESS NOTE ADULT - ASSESSMENT
75 yo male s/p robotic L partial Nx, POD #1
75yo male with PMH of hypothyroid, HTN, renal mass s/p left partial nephrectomy POD#0

## 2024-07-12 NOTE — DISCHARGE NOTE PROVIDER - NSDCFUADDINST_GEN_ALL_CORE_FT
STITCHES: The stitches in the incision will dissolve and fall out by themselves. Sometimes skin stitches may open, allowing a slight gaping of the incision. This is no problem if you keep the area clean.  There is a bluish colored waterproof glue over the incision as well – the glue will peel away and fall off on its own over a couple of weeks.      PAIN: You may have some intermittent pain for up to six (6) weeks post operatively. Pain does not signify any problem unless associated with fever, chills, or inability to void.  If you experience any fevers or chills please call immediately as this may be signs of an infection. You may take Tylenol (acetaminophen) 650-975mg and/or Motrin (ibuprofen) 400-600mg, both available over the counter, for pain every 6 hours as needed. Do not exceed 4000mg of Tylenol (acetaminophen) daily. You may alternate these medications such that you take one or the other every 3 hours for around the clock pain coverage.    STOOL SOFTENERS: Do not allow yourself to become constipated as straining may cause bleeding. Take stool softeners or a laxative (ex. Miralax, Colace, Senokot, ExLax, etc), available over the counter.    ANTICOAGULATION: If you are taking any blood thinning medications, please discuss with your urologist prior to restarting these medications unless otherwise specified.    BATHING: You may sponge bath/shower 24 hours after surgery. Can allow water to run over incision sites. No baths.    DIET: You may resume your regular diet and regular medication regimen.    ACTIVITY: No heavy lifting or strenuous exercise until you are evaluated at your post-operative appointment. Otherwise, you may return to your usual level of physical activity.    FOLLOW-UP: If you did not already schedule your post-operative appointment, please call your urologist to schedule and follow-up appointment.    CALL YOUR UROLOGIST IF: You have any bleeding that does not stop, inability to void >8 hours, fever over 100.4 F, chills, persistent nausea/vomiting, changes in your incision concerning for infection, or if your pain is not controlled on your discharge pain medications.

## 2024-07-12 NOTE — PROGRESS NOTE ADULT - PROBLEM SELECTOR PLAN 1
- S/P left partial nephrectomy   - continue lópez and monitor output   - pain control   - abx: Ancef   - OOB/IS   - SCDs
-stable  -OOB  -SCD's, IS  -f/u labs  -TO this am  -d/c planning
Forehead laceration, initial encounter

## 2024-07-12 NOTE — PROGRESS NOTE ADULT - SUBJECTIVE AND OBJECTIVE BOX
INTERVAL HPI/OVERNIGHT EVENTS:  No acute events overnight.    VITALS:    T(F): 98.3 (07-12-24 @ 04:52), Max: 99.2 (07-11-24 @ 12:55)  HR: 91 (07-12-24 @ 04:52) (67 - 91)  BP: 130/69 (07-12-24 @ 04:52) (113/57 - 143/81)  RR: 18 (07-12-24 @ 04:52) (14 - 18)  SpO2: 93% (07-12-24 @ 04:52) (92% - 95%)  Wt(kg): --    I&O's Detail    11 Jul 2024 07:01  -  12 Jul 2024 04:58  --------------------------------------------------------  IN:    IV PiggyBack: 100 mL    Lactated Ringers: 75 mL    Oral Fluid: 50 mL    sodium chloride 0.9%: 550 mL  Total IN: 775 mL    OUT:    Bulb (mL): 55 mL    Indwelling Catheter - Urethral (mL): 1730 mL  Total OUT: 1785 mL    Total NET: -1010 mL          MEDICATIONS:    ANTIBIOTICS:      PAIN CONTROL:  acetaminophen     Tablet .. 975 milliGRAM(s) Oral every 6 hours  HYDROmorphone  Injectable 0.5 milliGRAM(s) IV Push every 4 hours PRN  ondansetron Injectable 4 milliGRAM(s) IV Push every 6 hours PRN  oxyCODONE    IR 5 milliGRAM(s) Oral every 6 hours PRN       MEDS:      HEME/ONC  heparin   Injectable 5000 Unit(s) SubCutaneous every 8 hours        PHYSICAL EXAM:  General: No acute distress.  Alert and Oriented  Abdominal Exam: soft, incision sites clean and dry, DAYNA intact-serosanguinous   Exam: FC intact, urine clear      LABS:                RADIOLOGY & ADDITIONAL TESTS:     
   POST OP NOTE:  procedure: left partial nephrectomy   Patient denies any acute complaints. Denies chest pain, SOB, fevers, chills, nausea or vomiting. States pain is manageable       07-11-24 @ 07:01  -  07-11-24 @ 16:18  --------------------------------------------------------  IN: 445 mL / OUT: 185 mL / NET: 260 mL      T(C): 36.8 (07-11-24 @ 15:32), Max: 37.3 (07-11-24 @ 12:55)  HR: 77 (07-11-24 @ 15:32) (67 - 88)  BP: 125/71 (07-11-24 @ 15:32) (113/57 - 143/81)  RR: 18 (07-11-24 @ 15:32) (14 - 18)  SpO2: 94% (07-11-24 @ 15:32) (92% - 95%)    GEN: NAD  ABD: Soft, appropriately tender, non-distended. incisions c/d/i, DAYNA drain with serosanguinous output   : lópez in place draining yellow urine

## 2024-07-12 NOTE — DISCHARGE NOTE PROVIDER - NSDCMRMEDTOKEN_GEN_ALL_CORE_FT
alfuzosin 10 mg oral tablet, extended release: 1 tab(s) orally once a day  aspirin 81 mg oral tablet: orally once a day  levothyroxine 112 mcg (0.112 mg) oral tablet: 1 tab(s) orally once a day  Metoprolol Tartrate 50 mg oral tablet: 1 tab(s) orally 2 times a day  ramipril 2.5 mg oral tablet: orally once a day  rosuvastatin 20 mg oral capsule: 1 cap(s) orally once a day

## 2024-07-12 NOTE — DISCHARGE NOTE PROVIDER - CARE PROVIDER_API CALL
Rhys Lima  Urology  [address]  [place] [address]  Phone: [phone]  Fax: [phone]  Follow Up Time:

## 2024-07-12 NOTE — DISCHARGE NOTE NURSING/CASE MANAGEMENT/SOCIAL WORK - NSDCPEFALRISK_GEN_ALL_CORE
For information on Fall & Injury Prevention, visit: [email or web address] OR  [email or web address] OR  https://www.cdc.gov/steadi/patient.html

## 2024-07-12 NOTE — DISCHARGE NOTE PROVIDER - NSDCFUSCHEDAPPT_GEN_ALL_CORE_FT
Grey Pedroza  [facility] Physician [facility]  [facility] [address]  Scheduled Appointment: 09/26/2024

## 2024-07-12 NOTE — DISCHARGE NOTE PROVIDER - NSDCCPCAREPLAN_GEN_ALL_CORE_FT
PRINCIPAL DISCHARGE DIAGNOSIS  Diagnosis: Renal mass  Assessment and Plan of Treatment:       SECONDARY DISCHARGE DIAGNOSES  Diagnosis: Hypothyroidism  Assessment and Plan of Treatment:     Diagnosis: Hypertension  Assessment and Plan of Treatment:     Diagnosis: Hyperlipidemia  Assessment and Plan of Treatment:

## 2024-07-15 PROBLEM — N13.30 UNSPECIFIED HYDRONEPHROSIS: Chronic | Status: ACTIVE | Noted: 2024-07-10

## 2024-07-15 PROBLEM — N28.89 OTHER SPECIFIED DISORDERS OF KIDNEY AND URETER: Chronic | Status: ACTIVE | Noted: 2024-07-10

## 2024-07-15 PROBLEM — Z86.39 PERSONAL HISTORY OF OTHER ENDOCRINE, NUTRITIONAL AND METABOLIC DISEASE: Chronic | Status: ACTIVE | Noted: 2024-07-10

## 2024-07-15 PROBLEM — Z86.69 PERSONAL HISTORY OF OTHER DISEASES OF THE NERVOUS SYSTEM AND SENSE ORGANS: Chronic | Status: ACTIVE | Noted: 2024-07-10

## 2024-07-15 PROBLEM — I07.1 RHEUMATIC TRICUSPID INSUFFICIENCY: Chronic | Status: ACTIVE | Noted: 2024-07-10

## 2024-07-15 PROBLEM — E78.5 HYPERLIPIDEMIA, UNSPECIFIED: Chronic | Status: ACTIVE | Noted: 2024-07-10

## 2024-07-15 PROBLEM — L40.9 PSORIASIS, UNSPECIFIED: Chronic | Status: ACTIVE | Noted: 2024-07-10

## 2024-07-15 PROBLEM — K14.8 OTHER DISEASES OF TONGUE: Chronic | Status: ACTIVE | Noted: 2024-07-10

## 2024-07-15 PROBLEM — I34.0 NONRHEUMATIC MITRAL (VALVE) INSUFFICIENCY: Chronic | Status: ACTIVE | Noted: 2024-07-10

## 2024-07-15 PROBLEM — I25.10 ATHEROSCLEROTIC HEART DISEASE OF NATIVE CORONARY ARTERY WITHOUT ANGINA PECTORIS: Chronic | Status: ACTIVE | Noted: 2024-07-10

## 2024-07-15 LAB — SURGICAL PATHOLOGY STUDY: SIGNIFICANT CHANGE UP

## 2024-07-19 ENCOUNTER — APPOINTMENT (OUTPATIENT)
Dept: UROLOGY | Facility: CLINIC | Age: 74
End: 2024-07-19
Payer: MEDICARE

## 2024-07-19 VITALS
BODY MASS INDEX: 31.07 KG/M2 | HEIGHT: 64 IN | OXYGEN SATURATION: 97 % | SYSTOLIC BLOOD PRESSURE: 153 MMHG | DIASTOLIC BLOOD PRESSURE: 80 MMHG | HEART RATE: 72 BPM | WEIGHT: 182 LBS | TEMPERATURE: 98.1 F

## 2024-07-19 DIAGNOSIS — C64.2 MALIGNANT NEOPLASM OF LEFT KIDNEY, EXCEPT RENAL PELVIS: ICD-10-CM

## 2024-07-19 PROCEDURE — 99024 POSTOP FOLLOW-UP VISIT: CPT

## 2024-07-23 DIAGNOSIS — I10 ESSENTIAL (PRIMARY) HYPERTENSION: ICD-10-CM

## 2024-07-23 DIAGNOSIS — Z79.82 LONG TERM (CURRENT) USE OF ASPIRIN: ICD-10-CM

## 2024-07-23 DIAGNOSIS — E03.9 HYPOTHYROIDISM, UNSPECIFIED: ICD-10-CM

## 2024-07-23 DIAGNOSIS — I08.1 RHEUMATIC DISORDERS OF BOTH MITRAL AND TRICUSPID VALVES: ICD-10-CM

## 2024-07-23 DIAGNOSIS — C64.2 MALIGNANT NEOPLASM OF LEFT KIDNEY, EXCEPT RENAL PELVIS: ICD-10-CM

## 2024-07-23 DIAGNOSIS — Z79.899 OTHER LONG TERM (CURRENT) DRUG THERAPY: ICD-10-CM

## 2024-07-23 DIAGNOSIS — R33.9 RETENTION OF URINE, UNSPECIFIED: ICD-10-CM

## 2024-07-23 DIAGNOSIS — E78.5 HYPERLIPIDEMIA, UNSPECIFIED: ICD-10-CM

## 2024-07-23 DIAGNOSIS — N13.30 UNSPECIFIED HYDRONEPHROSIS: ICD-10-CM

## 2024-07-23 DIAGNOSIS — H40.9 UNSPECIFIED GLAUCOMA: ICD-10-CM

## 2024-07-23 DIAGNOSIS — K40.90 UNILATERAL INGUINAL HERNIA, WITHOUT OBSTRUCTION OR GANGRENE, NOT SPECIFIED AS RECURRENT: ICD-10-CM

## 2024-07-23 DIAGNOSIS — Z79.890 HORMONE REPLACEMENT THERAPY: ICD-10-CM

## 2024-07-23 DIAGNOSIS — L40.9 PSORIASIS, UNSPECIFIED: ICD-10-CM

## 2024-07-23 DIAGNOSIS — I25.10 ATHEROSCLEROTIC HEART DISEASE OF NATIVE CORONARY ARTERY WITHOUT ANGINA PECTORIS: ICD-10-CM

## 2024-09-26 ENCOUNTER — NON-APPOINTMENT (OUTPATIENT)
Age: 74
End: 2024-09-26

## 2024-09-26 ENCOUNTER — APPOINTMENT (OUTPATIENT)
Dept: HEART AND VASCULAR | Facility: CLINIC | Age: 74
End: 2024-09-26
Payer: MEDICARE

## 2024-09-26 VITALS
OXYGEN SATURATION: 96 % | TEMPERATURE: 98.4 F | HEIGHT: 64 IN | DIASTOLIC BLOOD PRESSURE: 62 MMHG | WEIGHT: 181 LBS | HEART RATE: 71 BPM | SYSTOLIC BLOOD PRESSURE: 116 MMHG | BODY MASS INDEX: 30.9 KG/M2

## 2024-09-26 DIAGNOSIS — I10 ESSENTIAL (PRIMARY) HYPERTENSION: ICD-10-CM

## 2024-09-26 DIAGNOSIS — I25.10 ATHEROSCLEROTIC HEART DISEASE OF NATIVE CORONARY ARTERY W/OUT ANGINA PECTORIS: ICD-10-CM

## 2024-09-26 PROCEDURE — 99214 OFFICE O/P EST MOD 30 MIN: CPT

## 2024-09-26 PROCEDURE — G2211 COMPLEX E/M VISIT ADD ON: CPT

## 2024-09-26 PROCEDURE — 93000 ELECTROCARDIOGRAM COMPLETE: CPT

## 2024-09-27 NOTE — HISTORY OF PRESENT ILLNESS
[FreeTextEntry1] : 74 year male who comes for Cardiology followup after partial nephrectomy for cancer with plans for CT monitoring. He is back to baseline. He denies having any chest pain, SOB, GREENFIELD, dizziness, palpitations, orthopnea, PND or syncope

## 2024-09-27 NOTE — ASSESSMENT
[FreeTextEntry1] : An EKG was performed to evaluate for arrhythmia and ischemia.  CAD-- on statin, ASA and beta blocker  Hypertension--We discussed a goal of /80 or lower in the office. BP   at  goal  I encouraged continued risk factor reduction and gradual increase in aerobic activity as tolerated   34  minutes were spent discussing cardiac risk excluding procedure time

## 2025-01-14 ENCOUNTER — APPOINTMENT (OUTPATIENT)
Dept: UROLOGY | Facility: CLINIC | Age: 75
End: 2025-01-14
Payer: MEDICARE

## 2025-01-14 ENCOUNTER — NON-APPOINTMENT (OUTPATIENT)
Age: 75
End: 2025-01-14

## 2025-01-14 VITALS
WEIGHT: 181 LBS | DIASTOLIC BLOOD PRESSURE: 61 MMHG | TEMPERATURE: 97.8 F | HEIGHT: 64 IN | HEART RATE: 62 BPM | BODY MASS INDEX: 30.9 KG/M2 | SYSTOLIC BLOOD PRESSURE: 128 MMHG

## 2025-01-14 DIAGNOSIS — N32.3 DIVERTICULUM OF BLADDER: ICD-10-CM

## 2025-01-14 DIAGNOSIS — N13.30 UNSPECIFIED HYDRONEPHROSIS: ICD-10-CM

## 2025-01-14 DIAGNOSIS — C64.2 MALIGNANT NEOPLASM OF LEFT KIDNEY, EXCEPT RENAL PELVIS: ICD-10-CM

## 2025-01-14 PROCEDURE — G2211 COMPLEX E/M VISIT ADD ON: CPT

## 2025-01-14 PROCEDURE — 99214 OFFICE O/P EST MOD 30 MIN: CPT

## 2025-01-15 PROBLEM — N32.3 BLADDER DIVERTICULUM: Status: ACTIVE | Noted: 2025-01-15

## 2025-04-17 ENCOUNTER — APPOINTMENT (OUTPATIENT)
Dept: HEART AND VASCULAR | Facility: CLINIC | Age: 75
End: 2025-04-17
Payer: MEDICARE

## 2025-04-17 VITALS
HEART RATE: 68 BPM | DIASTOLIC BLOOD PRESSURE: 70 MMHG | SYSTOLIC BLOOD PRESSURE: 102 MMHG | HEIGHT: 64 IN | TEMPERATURE: 98.4 F | OXYGEN SATURATION: 95 % | WEIGHT: 183 LBS | BODY MASS INDEX: 31.24 KG/M2

## 2025-04-17 DIAGNOSIS — I25.10 ATHEROSCLEROTIC HEART DISEASE OF NATIVE CORONARY ARTERY W/OUT ANGINA PECTORIS: ICD-10-CM

## 2025-04-17 PROCEDURE — 93306 TTE W/DOPPLER COMPLETE: CPT

## 2025-04-17 PROCEDURE — 99214 OFFICE O/P EST MOD 30 MIN: CPT | Mod: 25

## 2025-04-29 ENCOUNTER — APPOINTMENT (OUTPATIENT)
Dept: UROLOGY | Facility: CLINIC | Age: 75
End: 2025-04-29
Payer: MEDICARE

## 2025-04-29 ENCOUNTER — NON-APPOINTMENT (OUTPATIENT)
Age: 75
End: 2025-04-29

## 2025-04-29 DIAGNOSIS — C64.2 MALIGNANT NEOPLASM OF LEFT KIDNEY, EXCEPT RENAL PELVIS: ICD-10-CM

## 2025-04-29 DIAGNOSIS — N13.30 UNSPECIFIED HYDRONEPHROSIS: ICD-10-CM

## 2025-04-29 DIAGNOSIS — N32.3 DIVERTICULUM OF BLADDER: ICD-10-CM

## 2025-04-29 PROCEDURE — 99214 OFFICE O/P EST MOD 30 MIN: CPT

## 2025-04-29 PROCEDURE — G2211 COMPLEX E/M VISIT ADD ON: CPT

## 2025-05-02 ENCOUNTER — NON-APPOINTMENT (OUTPATIENT)
Age: 75
End: 2025-05-02

## 2025-05-02 LAB
APPEARANCE: CLEAR
BACTERIA: NEGATIVE /HPF
BILIRUBIN URINE: NEGATIVE
BLOOD URINE: NEGATIVE
CAST: 0 /LPF
COLOR: YELLOW
EPITHELIAL CELLS: 0 /HPF
GLUCOSE QUALITATIVE U: NEGATIVE MG/DL
KETONES URINE: NEGATIVE MG/DL
LEUKOCYTE ESTERASE URINE: NEGATIVE
MICROSCOPIC-UA: NORMAL
NITRITE URINE: NEGATIVE
PH URINE: 6
PROTEIN URINE: 30 MG/DL
RED BLOOD CELLS URINE: 5 /HPF
REVIEW: NORMAL
SPECIFIC GRAVITY URINE: 1.01
UROBILINOGEN URINE: 0.2 MG/DL
WHITE BLOOD CELLS URINE: 1 /HPF

## 2025-06-18 ENCOUNTER — NON-APPOINTMENT (OUTPATIENT)
Age: 75
End: 2025-06-18

## (undated) DEVICE — SUT CLIP LAPRA-TY ABSORBABLE SIZE 0.118 TO 0.12" VIOLET

## (undated) DEVICE — XI OBTURATOR OPTICAL BLADELESS 8MM

## (undated) DEVICE — XI VESSEL SEALER

## (undated) DEVICE — D HELP - CLEARVIEW CLEARIFY SYSTEM

## (undated) DEVICE — INSUFFLATION NDL COVIDIEN SURGINEEDLE VERESS 120MM

## (undated) DEVICE — LAP PAD 4 X 18"

## (undated) DEVICE — XI DRAPE ARM

## (undated) DEVICE — Device

## (undated) DEVICE — GLV 8 PROTEXIS (WHITE)

## (undated) DEVICE — XI TIP COVER

## (undated) DEVICE — SUT VICRYL 0 36" CT-1 UNDYED

## (undated) DEVICE — TIP METZENBAUM SCISSOR MONOPOLAR ENDOCUT (ORANGE)

## (undated) DEVICE — DRAPE INSTRUMENT POUCH 10" X 18"

## (undated) DEVICE — XI SEAL UNIVERSIAL 5-12MM

## (undated) DEVICE — SUT PROLENE 4-0 36" RB-1

## (undated) DEVICE — ENDOCATCH 10MM SPECIMEN POUCH

## (undated) DEVICE — FOLEY TRAY 16FR 5CC LF UMETER CLOSED

## (undated) DEVICE — TROCAR SURGIQUEST AIRSEAL 12MMX100MM

## (undated) DEVICE — DRAIN RESERVOIR FOR JACKSON PRATT 100CC CARDINAL

## (undated) DEVICE — XI DRAPE COLUMN

## (undated) DEVICE — DRAIN JACKSON PRATT 10MM FLAT 3/4 NO TROCAR

## (undated) DEVICE — SOL INJ NS 0.9% 1000ML

## (undated) DEVICE — XI ARM SCISSOR MONO CURVED

## (undated) DEVICE — POSITIONER PINK PAD PIGAZZI SYSTEM FULL KIT

## (undated) DEVICE — TROCAR COVIDIEN VERSAPORT BLADELESS OPTICAL 5MM STANDARD

## (undated) DEVICE — TUBING AIRSEAL TRI-LUMEN FILTERED

## (undated) DEVICE — DRAINAGE BAG URINARY 2L

## (undated) DEVICE — SUT VICRYL 0 27" UR-6

## (undated) DEVICE — TAPE SILK 3"

## (undated) DEVICE — ENDOPATH 5MM CVD SCISSOR W MONOPOLAR CAUTERY DISP

## (undated) DEVICE — SUT MONOCRYL 4-0 27" PS-2 UNDYED

## (undated) DEVICE — DRSG DERMABOND 0.7ML

## (undated) DEVICE — SUT VICRYL 3-0 27" SH UNDYED

## (undated) DEVICE — PACK GENERAL LAPAROSCOPY